# Patient Record
Sex: MALE | Race: WHITE | NOT HISPANIC OR LATINO | Employment: FULL TIME | URBAN - METROPOLITAN AREA
[De-identification: names, ages, dates, MRNs, and addresses within clinical notes are randomized per-mention and may not be internally consistent; named-entity substitution may affect disease eponyms.]

---

## 2023-02-21 LAB
ALANINE AMINOTRANSFERASE (SGPT) (U/L) IN SER/PLAS: 67 U/L (ref 10–52)
ALBUMIN (G/DL) IN SER/PLAS: 4.8 G/DL (ref 3.4–5)
ALKALINE PHOSPHATASE (U/L) IN SER/PLAS: 75 U/L (ref 33–120)
ANION GAP IN SER/PLAS: 12 MMOL/L (ref 10–20)
ASPARTATE AMINOTRANSFERASE (SGOT) (U/L) IN SER/PLAS: 56 U/L (ref 9–39)
BASOPHILS (10*3/UL) IN BLOOD BY AUTOMATED COUNT: 0.03 X10E9/L (ref 0–0.1)
BASOPHILS/100 LEUKOCYTES IN BLOOD BY AUTOMATED COUNT: 0.6 % (ref 0–2)
BILIRUBIN DIRECT (MG/DL) IN SER/PLAS: 0.2 MG/DL (ref 0–0.3)
BILIRUBIN TOTAL (MG/DL) IN SER/PLAS: 1.2 MG/DL (ref 0–1.2)
CALCIUM (MG/DL) IN SER/PLAS: 10.8 MG/DL (ref 8.6–10.6)
CARBON DIOXIDE, TOTAL (MMOL/L) IN SER/PLAS: 29 MMOL/L (ref 21–32)
CERULOPLASMIN (MG/DL) IN SER/PLAS: 24 MG/DL (ref 20–60)
CHLORIDE (MMOL/L) IN SER/PLAS: 102 MMOL/L (ref 98–107)
CHOLESTEROL (MG/DL) IN SER/PLAS: 263 MG/DL (ref 0–199)
CHOLESTEROL IN HDL (MG/DL) IN SER/PLAS: 46.8 MG/DL
CHOLESTEROL/HDL RATIO: 5.6
CREATININE (MG/DL) IN SER/PLAS: 0.98 MG/DL (ref 0.5–1.3)
EOSINOPHILS (10*3/UL) IN BLOOD BY AUTOMATED COUNT: 0.03 X10E9/L (ref 0–0.7)
EOSINOPHILS/100 LEUKOCYTES IN BLOOD BY AUTOMATED COUNT: 0.6 % (ref 0–6)
ERYTHROCYTE DISTRIBUTION WIDTH (RATIO) BY AUTOMATED COUNT: 11.7 % (ref 11.5–14.5)
ERYTHROCYTE MEAN CORPUSCULAR HEMOGLOBIN CONCENTRATION (G/DL) BY AUTOMATED: 34.2 G/DL (ref 32–36)
ERYTHROCYTE MEAN CORPUSCULAR VOLUME (FL) BY AUTOMATED COUNT: 94 FL (ref 80–100)
ERYTHROCYTES (10*6/UL) IN BLOOD BY AUTOMATED COUNT: 4.7 X10E12/L (ref 4.5–5.9)
FERRITIN (UG/LL) IN SER/PLAS: 1060 UG/L (ref 20–300)
GFR MALE: >90 ML/MIN/1.73M2
GLUCOSE (MG/DL) IN SER/PLAS: 102 MG/DL (ref 74–99)
HEMATOCRIT (%) IN BLOOD BY AUTOMATED COUNT: 44.1 % (ref 41–52)
HEMOGLOBIN (G/DL) IN BLOOD: 15.1 G/DL (ref 13.5–17.5)
HEPATITIS A TOTAL AB INTERPRETATION: NONREACTIVE
HEPATITIS B VIRUS SURFACE AB (MIU/ML) IN SERUM: <3.1 MIU/ML
HEPATITIS B VIRUS SURFACE AG PRESENCE IN SERUM: NONREACTIVE
HEPATITIS C VIRUS AB PRESENCE IN SERUM: NONREACTIVE
IMMATURE GRANULOCYTES/100 LEUKOCYTES IN BLOOD BY AUTOMATED COUNT: 0.4 % (ref 0–0.9)
INR IN PPP BY COAGULATION ASSAY: 1 (ref 0.9–1.1)
IRON (UG/DL) IN SER/PLAS: 168 UG/DL (ref 35–150)
IRON BINDING CAPACITY (UG/DL) IN SER/PLAS: 410 UG/DL (ref 240–445)
IRON SATURATION (%) IN SER/PLAS: 41 % (ref 25–45)
LDL: ABNORMAL MG/DL (ref 0–99)
LEUKOCYTES (10*3/UL) IN BLOOD BY AUTOMATED COUNT: 5.1 X10E9/L (ref 4.4–11.3)
LYMPHOCYTES (10*3/UL) IN BLOOD BY AUTOMATED COUNT: 2.28 X10E9/L (ref 1.2–4.8)
LYMPHOCYTES/100 LEUKOCYTES IN BLOOD BY AUTOMATED COUNT: 44.4 % (ref 13–44)
MONOCYTES (10*3/UL) IN BLOOD BY AUTOMATED COUNT: 0.31 X10E9/L (ref 0.1–1)
MONOCYTES/100 LEUKOCYTES IN BLOOD BY AUTOMATED COUNT: 6 % (ref 2–10)
NEUTROPHILS (10*3/UL) IN BLOOD BY AUTOMATED COUNT: 2.47 X10E9/L (ref 1.2–7.7)
NEUTROPHILS/100 LEUKOCYTES IN BLOOD BY AUTOMATED COUNT: 48 % (ref 40–80)
NRBC (PER 100 WBCS) BY AUTOMATED COUNT: 0 /100 WBC (ref 0–0)
PLATELETS (10*3/UL) IN BLOOD AUTOMATED COUNT: 170 X10E9/L (ref 150–450)
POTASSIUM (MMOL/L) IN SER/PLAS: 3.9 MMOL/L (ref 3.5–5.3)
PROTEIN TOTAL: 7.4 G/DL (ref 6.4–8.2)
PROTHROMBIN TIME (PT) IN PPP BY COAGULATION ASSAY: 11.5 SEC (ref 9.8–13.4)
SODIUM (MMOL/L) IN SER/PLAS: 139 MMOL/L (ref 136–145)
TRIGLYCERIDE (MG/DL) IN SER/PLAS: 615 MG/DL (ref 0–149)
UREA NITROGEN (MG/DL) IN SER/PLAS: 14 MG/DL (ref 6–23)
VLDL: ABNORMAL MG/DL (ref 0–40)

## 2023-02-23 LAB
ANTI-NUCLEAR ANTIBODY (ANA): NEGATIVE
ANTI-SMOOTH MUSCLE ANTIBODY: ABNORMAL
MITOCHONDRIAL ANTIBODY: NEGATIVE

## 2023-02-27 LAB
ALPHA-1 ANTITRYPSIN PHENOTYPE: NORMAL
ALPHA-1-ANTITRYPSIN (REF): 127 MG/DL (ref 90–200)

## 2023-04-13 RX ORDER — LAMOTRIGINE 25 MG/1
75 TABLET ORAL 2 TIMES DAILY
COMMUNITY
Start: 2022-11-14

## 2023-04-13 RX ORDER — AMLODIPINE BESYLATE 5 MG/1
1 TABLET ORAL DAILY
COMMUNITY
Start: 2022-07-21 | End: 2023-04-14

## 2023-04-13 RX ORDER — SERTRALINE HYDROCHLORIDE 100 MG/1
2 TABLET, FILM COATED ORAL DAILY
COMMUNITY
Start: 2021-02-10 | End: 2023-05-08 | Stop reason: ALTCHOICE

## 2023-04-14 ENCOUNTER — OFFICE VISIT (OUTPATIENT)
Dept: PRIMARY CARE | Facility: CLINIC | Age: 48
End: 2023-04-14
Payer: COMMERCIAL

## 2023-04-14 VITALS
WEIGHT: 295 LBS | TEMPERATURE: 97.6 F | BODY MASS INDEX: 35.92 KG/M2 | HEIGHT: 76 IN | DIASTOLIC BLOOD PRESSURE: 100 MMHG | SYSTOLIC BLOOD PRESSURE: 180 MMHG

## 2023-04-14 DIAGNOSIS — M62.838 MUSCLE SPASM: ICD-10-CM

## 2023-04-14 DIAGNOSIS — E78.1 HYPERTRIGLYCERIDEMIA: ICD-10-CM

## 2023-04-14 DIAGNOSIS — I10 PRIMARY HYPERTENSION: ICD-10-CM

## 2023-04-14 DIAGNOSIS — Z00.00 HEALTHCARE MAINTENANCE: Primary | ICD-10-CM

## 2023-04-14 PROCEDURE — 99215 OFFICE O/P EST HI 40 MIN: CPT | Performed by: INTERNAL MEDICINE

## 2023-04-14 PROCEDURE — 3080F DIAST BP >= 90 MM HG: CPT | Performed by: INTERNAL MEDICINE

## 2023-04-14 PROCEDURE — 1036F TOBACCO NON-USER: CPT | Performed by: INTERNAL MEDICINE

## 2023-04-14 PROCEDURE — 3077F SYST BP >= 140 MM HG: CPT | Performed by: INTERNAL MEDICINE

## 2023-04-14 RX ORDER — AMLODIPINE BESYLATE 10 MG/1
10 TABLET ORAL DAILY
Qty: 30 TABLET | Refills: 3 | Status: SHIPPED | OUTPATIENT
Start: 2023-04-14 | End: 2023-10-24

## 2023-04-14 RX ORDER — DESVENLAFAXINE SUCCINATE 25 MG/1
50 TABLET, EXTENDED RELEASE ORAL DAILY
COMMUNITY
Start: 2023-03-24 | End: 2024-03-20 | Stop reason: ALTCHOICE

## 2023-04-14 RX ORDER — CYCLOBENZAPRINE HCL 10 MG
10 TABLET ORAL 3 TIMES DAILY PRN
Qty: 30 TABLET | Refills: 2 | Status: SHIPPED | OUTPATIENT
Start: 2023-04-14 | End: 2023-11-17 | Stop reason: WASHOUT

## 2023-04-14 RX ORDER — EZETIMIBE 10 MG/1
10 TABLET ORAL DAILY
Qty: 30 TABLET | Refills: 5 | Status: SHIPPED | OUTPATIENT
Start: 2023-04-14 | End: 2024-01-31 | Stop reason: HOSPADM

## 2023-04-14 RX ORDER — LORATADINE 10 MG/1
10 TABLET ORAL
COMMUNITY

## 2023-04-14 ASSESSMENT — PATIENT HEALTH QUESTIONNAIRE - PHQ9
1. LITTLE INTEREST OR PLEASURE IN DOING THINGS: NOT AT ALL
2. FEELING DOWN, DEPRESSED OR HOPELESS: SEVERAL DAYS
SUM OF ALL RESPONSES TO PHQ9 QUESTIONS 1 AND 2: 1

## 2023-04-14 NOTE — PROGRESS NOTES
Subjective   Patient ID: [id number]   [facility]    Chano Smith is a 48 y.o. male who presents for Back Pain (Left back), Hip Pain (Left hip pain, No injury, been happening for a while. Easter is when it started again. 7-8 pain level but then it will flare up and be off the scale. ), and Foot Swelling (Left foot swollen.).He used to drink a lot before he stopped drinking for a while but last week he drank.He was following with Dr Hutchison who ordered all his lab work last February and March and reviewed with him.  He is triglyceride, total cholesterol and LDL high, his level [place] is high most likely due to drinking.  His ferritin is also high more than thousand.  He is having back pain and patient was tearful today this morning and blood pressure found to be elevated as per patient his body weight is going up.        Objective   Visit Vitals  BP (!) 180/100 (BP Location: Left arm)   Temp 36.4 °C (97.6 °F) (Temporal)      Review of Systems  All 12 systems reviewed, no other abnormality except that mentioned in HPI.    Physical Exam  Constitutional- no abnormality  ENT- no abnormality  Neck- no swelling  CVS- normal S1 and S2, no murmur.  Pulmonary- clear to auscultation,no rhonchi, no wheezes.  Abdomen- normal- liver and spleen, soft, no distension, bowel sound present.  Neurological- all cranial nerves intact, speech and gait normal, no sensory or motor deficiency.  Musculoskeletal- all pulses are normal, normal movement, no joint swelling.  Skin- no rash, dry.  psychiatry- no suicidal ideation.  Genitourinary system- no abnormality.  Lymph node- no lyphadenopathy    Assessment/Plan   Problem List Items Addressed This Visit          Circulatory    Primary hypertension    Relevant Medications    amLODIPine (Norvasc) 10 mg tablet    Other Relevant Orders    Follow Up In Primary Care       Musculoskeletal    Muscle spasm    Relevant Medications    cyclobenzaprine (Flexeril) 10 mg tablet    Other Relevant Orders    Referral to  Physical Therapy       Other    Hypertriglyceridemia    Relevant Medications    ezetimibe (Zetia) 10 mg tablet    Healthcare maintenance - Primary    Relevant Orders    TSH with reflex to Free T4 if abnormal    Hemoglobin A1C    Comprehensive Metabolic Panel   1.  Hypertriglyceridemia: He has liver enzyme elevation due to drinking, advised to quit drinking completely, started ezetimibe 10 mg p.o. daily.  And dietary control.  2.  Hypertension: His blood pressures high found to be 180/100, changed amlodipine 5 mg to 10 mg p.o. daily.  1 month follow-up  3.  Care maintenance: He is thyroid function test hemoglobin A1c and CMP ordered.  4.  Morbidly obese: Advised to cut down calorie, physical activities.      Patel Oreilly MD

## 2023-05-08 ENCOUNTER — OFFICE VISIT (OUTPATIENT)
Dept: PRIMARY CARE | Facility: CLINIC | Age: 48
End: 2023-05-08
Payer: COMMERCIAL

## 2023-05-08 VITALS
TEMPERATURE: 97.9 F | DIASTOLIC BLOOD PRESSURE: 87 MMHG | SYSTOLIC BLOOD PRESSURE: 130 MMHG | WEIGHT: 301 LBS | HEART RATE: 90 BPM | HEIGHT: 74 IN | BODY MASS INDEX: 38.63 KG/M2

## 2023-05-08 DIAGNOSIS — K76.0 FATTY LIVER: ICD-10-CM

## 2023-05-08 DIAGNOSIS — E78.1 HYPERTRIGLYCERIDEMIA: Primary | ICD-10-CM

## 2023-05-08 DIAGNOSIS — I10 PRIMARY HYPERTENSION: ICD-10-CM

## 2023-05-08 DIAGNOSIS — R73.01 IMPAIRED FASTING BLOOD SUGAR: ICD-10-CM

## 2023-05-08 PROBLEM — E83.42 HYPOMAGNESEMIA: Status: RESOLVED | Noted: 2023-05-08 | Resolved: 2023-05-08

## 2023-05-08 PROBLEM — E83.52 HYPERCALCEMIA: Status: RESOLVED | Noted: 2023-05-08 | Resolved: 2023-05-08

## 2023-05-08 PROBLEM — M72.2 PLANTAR FASCIITIS: Status: ACTIVE | Noted: 2023-05-08

## 2023-05-08 PROBLEM — G47.00 INSOMNIA: Status: ACTIVE | Noted: 2023-05-08

## 2023-05-08 PROBLEM — F41.9 ANXIETY: Status: ACTIVE | Noted: 2023-05-08

## 2023-05-08 PROBLEM — J30.89 ALLERGIC RHINITIS DUE TO DUST MITE: Status: RESOLVED | Noted: 2023-05-08 | Resolved: 2023-05-08

## 2023-05-08 PROBLEM — D69.6 THROMBOCYTOPENIA (CMS-HCC): Status: ACTIVE | Noted: 2023-05-08

## 2023-05-08 PROBLEM — J30.2 SEASONAL ALLERGIES: Status: ACTIVE | Noted: 2023-05-08

## 2023-05-08 PROBLEM — R74.8 ELEVATED LIVER ENZYMES: Status: ACTIVE | Noted: 2023-05-08

## 2023-05-08 PROBLEM — M76.72 PERONEAL TENDONITIS OF LEFT LOWER EXTREMITY: Status: RESOLVED | Noted: 2023-05-08 | Resolved: 2023-05-08

## 2023-05-08 PROBLEM — F10.20 ALCOHOL USE DISORDER, SEVERE, DEPENDENCE (MULTI): Status: RESOLVED | Noted: 2023-05-08 | Resolved: 2023-05-08

## 2023-05-08 PROBLEM — J30.1 ALLERGIC RHINITIS DUE TO POLLEN: Status: RESOLVED | Noted: 2023-05-08 | Resolved: 2023-05-08

## 2023-05-08 PROBLEM — M21.70 LEG LENGTH DIFFERENCE, ACQUIRED: Status: RESOLVED | Noted: 2023-05-08 | Resolved: 2023-05-08

## 2023-05-08 PROBLEM — R16.1 SPLEEN ENLARGED: Status: ACTIVE | Noted: 2023-05-08

## 2023-05-08 PROBLEM — F32.A DEPRESSION: Status: ACTIVE | Noted: 2023-05-08

## 2023-05-08 PROBLEM — D72.819 LEUKOPENIA: Status: RESOLVED | Noted: 2023-05-08 | Resolved: 2023-05-08

## 2023-05-08 PROCEDURE — 3008F BODY MASS INDEX DOCD: CPT | Performed by: NURSE PRACTITIONER

## 2023-05-08 PROCEDURE — 99214 OFFICE O/P EST MOD 30 MIN: CPT | Performed by: NURSE PRACTITIONER

## 2023-05-08 PROCEDURE — 3075F SYST BP GE 130 - 139MM HG: CPT | Performed by: NURSE PRACTITIONER

## 2023-05-08 PROCEDURE — 3079F DIAST BP 80-89 MM HG: CPT | Performed by: NURSE PRACTITIONER

## 2023-05-08 PROCEDURE — 1036F TOBACCO NON-USER: CPT | Performed by: NURSE PRACTITIONER

## 2023-05-08 RX ORDER — CLONAZEPAM 0.5 MG/1
0.5 TABLET ORAL NIGHTLY
COMMUNITY
Start: 2023-05-02

## 2023-05-08 ASSESSMENT — ENCOUNTER SYMPTOMS
CHILLS: 0
DIZZINESS: 0
NAUSEA: 0
HEADACHES: 0
PALPITATIONS: 0
NUMBNESS: 0
COUGH: 0
FATIGUE: 0
VOMITING: 0
DIARRHEA: 0
CHEST TIGHTNESS: 0
WEAKNESS: 0
FEVER: 0
ABDOMINAL PAIN: 0
SHORTNESS OF BREATH: 0

## 2023-05-08 NOTE — PROGRESS NOTES
"Subjective   Chano Smith is a 48 y.o. male who presents for Follow-up (6 month).    HPI   He is here today for medication refills and follow up.  Tolerating medication well at current dose- no side effects. Has noticed some mild edema in feet. No chest pain, shortness of breath, palpitations or edema. No numbness, tingling, weakness or vision changes.  (+) headaches -severe at times  Has noticed some changes in vision. Has eye exam next week  No dizziness.  Home blood pressure readings:126/78  Last eye exam: last week  Diet: Working on it  Exercise: Needs to get back into it regularly.  Weight: Trending up.  Has cut way back on alcohol.  Did a half dry January and dry lent.    He feels he is doing much better on the current medication regimen.  Less impulsive than prior.  Following with psychiatry- Lore Olguin- [facility].  Has had quite a few medication changes.  (Going every 3 months)  Following with hepatology Dr. Forrester- follow up in October.  Had Fibroscan  Saw Hematology- no further follow up recommended    Last labs:     Review of Systems   Constitutional:  Negative for chills, fatigue and fever.   Eyes:  Positive for visual disturbance.   Respiratory:  Negative for cough, chest tightness and shortness of breath.    Cardiovascular:  Positive for leg swelling. Negative for chest pain and palpitations.   Gastrointestinal:  Negative for abdominal pain, diarrhea, nausea and vomiting.   Neurological:  Negative for dizziness, weakness, numbness and headaches.       Objective   /87 (BP Location: Left arm, Patient Position: Sitting)   Pulse 90   Temp 36.6 °C (97.9 °F) (Temporal)   Ht 1.873 m (6' 1.75\")   Wt 137 kg (301 lb)   BMI 38.91 kg/m²     Physical Exam  Constitutional:       General: He is not in acute distress.     Appearance: Normal appearance. He is not toxic-appearing.   Eyes:      Extraocular Movements: Extraocular movements intact.      Conjunctiva/sclera: Conjunctivae normal.      " Pupils: Pupils are equal, round, and reactive to light.   Neck:      Vascular: No carotid bruit.   Cardiovascular:      Rate and Rhythm: Normal rate and regular rhythm.      Pulses: Normal pulses.      Heart sounds: Normal heart sounds, S1 normal and S2 normal. No murmur heard.  Pulmonary:      Effort: Pulmonary effort is normal. No respiratory distress.      Breath sounds: Normal breath sounds.   Abdominal:      General: Bowel sounds are normal.      Palpations: Abdomen is soft.      Tenderness: There is no abdominal tenderness.   Musculoskeletal:      Right lower leg: No edema.      Left lower leg: No edema.   Lymphadenopathy:      Cervical: No cervical adenopathy.   Neurological:      Mental Status: He is alert and oriented to person, place, and time.   Psychiatric:         Attention and Perception: Attention normal.         Mood and Affect: Mood and affect normal.         Behavior: Behavior normal. Behavior is cooperative.         Thought Content: Thought content normal.         Cognition and Memory: Cognition normal.         Judgment: Judgment normal.         Assessment/Plan   Problem List Items Addressed This Visit          Circulatory    Primary hypertension     Well controlled on current medication. Continue.            Digestive    Fatty liver     Following with Dr. Manning.            Endocrine/Metabolic    Impaired fasting blood sugar     Check hemoglobin A1C.            Other    Hypertriglyceridemia - Primary     On Zetia- recheck labs after on for 3 months- order placed.  May consider statin- would check with Usama if needed.         Relevant Orders    Lipid Panel     Other Visit Diagnoses       BMI 38.0-38.9,adult        Relevant Orders    Referral to Nutrition Services

## 2023-05-11 NOTE — ASSESSMENT & PLAN NOTE
On Zetia- recheck labs after on for 3 months- order placed.  May consider statin- would check with Usama if needed.

## 2023-06-26 ENCOUNTER — OFFICE VISIT (OUTPATIENT)
Dept: PRIMARY CARE | Facility: CLINIC | Age: 48
End: 2023-06-26
Payer: COMMERCIAL

## 2023-06-26 VITALS
BODY MASS INDEX: 35.68 KG/M2 | SYSTOLIC BLOOD PRESSURE: 154 MMHG | HEART RATE: 93 BPM | HEIGHT: 77 IN | TEMPERATURE: 96.9 F | WEIGHT: 302.2 LBS | DIASTOLIC BLOOD PRESSURE: 102 MMHG

## 2023-06-26 DIAGNOSIS — R07.81 RIB PAIN ON RIGHT SIDE: Primary | ICD-10-CM

## 2023-06-26 DIAGNOSIS — M54.2 NECK PAIN: ICD-10-CM

## 2023-06-26 PROCEDURE — 3008F BODY MASS INDEX DOCD: CPT | Performed by: NURSE PRACTITIONER

## 2023-06-26 PROCEDURE — 3080F DIAST BP >= 90 MM HG: CPT | Performed by: NURSE PRACTITIONER

## 2023-06-26 PROCEDURE — 3077F SYST BP >= 140 MM HG: CPT | Performed by: NURSE PRACTITIONER

## 2023-06-26 PROCEDURE — 1036F TOBACCO NON-USER: CPT | Performed by: NURSE PRACTITIONER

## 2023-06-26 PROCEDURE — 99213 OFFICE O/P EST LOW 20 MIN: CPT | Performed by: NURSE PRACTITIONER

## 2023-06-26 ASSESSMENT — ENCOUNTER SYMPTOMS
NECK PAIN: 0
CHILLS: 0
FEVER: 0
WEAKNESS: 0
FATIGUE: 0
NUMBNESS: 0
HEADACHES: 1
SHORTNESS OF BREATH: 0
COUGH: 0

## 2023-06-26 NOTE — PATIENT INSTRUCTIONS
Obtain the x-ray of the ribs as ordered today.  Referral to PT for neck pain.  Let me know if there is no improvement or symptoms worse.

## 2023-06-26 NOTE — PROGRESS NOTES
"Subjective   Patient ID: Chano Smith is a 48 y.o. male who presents for Rib Injury (Pain with cough sneezing, or deep breaths).    HPI  He reports he is having pain in the right rib which started 2 weeks ago while doing Judo. While doing moves he felt and heard a pop with associated pain. Since then it has hurt with deep breaths, coughing, sneezing and moving. It is worse first thing in the morning and gets better as the day goes one.  No SOB noted.  No bruising noted.    He also reports he has some head pain that goes into the neck and shoulder. No radiation down the arm.  Neck cracks with certain movements.  No numbness/tingling/weakness.  (+)headaches frequently.  (+) history of playing football and multiple falls.    Review of Systems   Constitutional:  Negative for chills, fatigue and fever.   Respiratory:  Negative for cough and shortness of breath.    Musculoskeletal:  Negative for neck pain.   Neurological:  Positive for headaches. Negative for weakness and numbness.       Objective   BP (!) 154/102   Pulse 93   Temp 36.1 °C (96.9 °F)   Ht 1.956 m (6' 5\")   Wt 137 kg (302 lb 3.2 oz)   BMI 35.84 kg/m²     Physical Exam  Constitutional:       General: He is not in acute distress.     Appearance: Normal appearance. He is not toxic-appearing.   Cardiovascular:      Rate and Rhythm: Normal rate and regular rhythm.      Heart sounds: Normal heart sounds, S1 normal and S2 normal. No murmur heard.  Pulmonary:      Effort: Pulmonary effort is normal.      Breath sounds: Normal breath sounds and air entry.   Abdominal:      General: Bowel sounds are normal.      Palpations: Abdomen is soft.      Tenderness: There is no abdominal tenderness.   Lymphadenopathy:      Cervical: No cervical adenopathy.   Neurological:      Mental Status: He is alert and oriented to person, place, and time.      Motor: Motor function is intact.   Psychiatric:         Attention and Perception: Attention normal.         Mood and " Affect: Mood and affect normal.         Behavior: Behavior normal.         Thought Content: Thought content normal.         Judgment: Judgment normal.         Assessment/Plan   Problem List Items Addressed This Visit          Nervous    Neck pain    Relevant Orders    Referral to Physical Therapy       Musculoskeletal    Rib pain on right side - Primary    Relevant Orders    XR ribs right 2 views            It has been a pleasure seeing you today!

## 2023-08-21 RX ORDER — AMLODIPINE BESYLATE 5 MG/1
5 TABLET ORAL DAILY
COMMUNITY
End: 2023-11-17 | Stop reason: WASHOUT

## 2023-08-22 PROBLEM — E87.5 HYPERKALEMIA: Status: ACTIVE | Noted: 2023-08-22

## 2023-10-02 ENCOUNTER — APPOINTMENT (OUTPATIENT)
Dept: HEMATOLOGY/ONCOLOGY | Facility: CLINIC | Age: 48
End: 2023-10-02
Payer: COMMERCIAL

## 2023-10-23 DIAGNOSIS — I10 PRIMARY HYPERTENSION: ICD-10-CM

## 2023-10-24 RX ORDER — AMLODIPINE BESYLATE 10 MG/1
10 TABLET ORAL DAILY
Qty: 30 TABLET | Refills: 1 | Status: SHIPPED | OUTPATIENT
Start: 2023-10-24 | End: 2024-01-16

## 2023-10-27 ENCOUNTER — OFFICE VISIT (OUTPATIENT)
Dept: OPHTHALMOLOGY | Facility: CLINIC | Age: 48
End: 2023-10-27

## 2023-10-27 DIAGNOSIS — H52.4 PRESBYOPIA: ICD-10-CM

## 2023-10-27 DIAGNOSIS — H52.223 REGULAR ASTIGMATISM OF BOTH EYES: ICD-10-CM

## 2023-10-27 DIAGNOSIS — H52.03 HYPEROPIA OF BOTH EYES: Primary | ICD-10-CM

## 2023-10-27 PROCEDURE — 92310 CONTACT LENS FITTING OU: CPT | Performed by: OPTOMETRIST

## 2023-10-27 PROCEDURE — V2520 CONTACT LENS HYDROPHILIC: HCPCS | Performed by: OPTOMETRIST

## 2023-10-27 PROCEDURE — V2521 CNTCT LENS HYDROPHILIC TORIC: HCPCS | Performed by: OPTOMETRIST

## 2023-10-27 ASSESSMENT — ENCOUNTER SYMPTOMS
GASTROINTESTINAL NEGATIVE: 0
ENDOCRINE NEGATIVE: 0
CONSTITUTIONAL NEGATIVE: 0
CARDIOVASCULAR NEGATIVE: 1
ALLERGIC/IMMUNOLOGIC NEGATIVE: 0
MUSCULOSKELETAL NEGATIVE: 0
RESPIRATORY NEGATIVE: 0
PSYCHIATRIC NEGATIVE: 1
NEUROLOGICAL NEGATIVE: 0
EYES NEGATIVE: 0
HEMATOLOGIC/LYMPHATIC NEGATIVE: 0

## 2023-10-27 ASSESSMENT — REFRACTION_MANIFEST
OS_SPHERE: +1.00
OS_SPHERE: +1.50
OS_AXIS: 045
OD_ADD: +2.00
OD_AXIS: 096
OD_CYLINDER: -1.50
OS_CYLINDER: -0.50
OD_CYLINDER: -1.25
OS_CYLINDER: -0.50
OS_AXIS: 060
METHOD_AUTOREFRACTION: 1
OD_SPHERE: +3.00
OS_ADD: +2.00
OD_AXIS: 096
OD_SPHERE: +3.50

## 2023-10-27 ASSESSMENT — CONF VISUAL FIELD
OD_INFERIOR_TEMPORAL_RESTRICTION: 0
OS_NORMAL: 1
METHOD: COUNTING FINGERS
OS_INFERIOR_TEMPORAL_RESTRICTION: 0
OD_SUPERIOR_TEMPORAL_RESTRICTION: 0
OS_SUPERIOR_NASAL_RESTRICTION: 0
OS_INFERIOR_NASAL_RESTRICTION: 0
OD_INFERIOR_NASAL_RESTRICTION: 0
OS_SUPERIOR_TEMPORAL_RESTRICTION: 0
OD_NORMAL: 1
OD_SUPERIOR_NASAL_RESTRICTION: 0

## 2023-10-27 ASSESSMENT — VISUAL ACUITY
OD_CC+: -1
CORRECTION_TYPE: GLASSES
OD_CC: 20/20
OD_CC: 20/20
METHOD: SNELLEN - LINEAR
OS_CC: 20/20
VA_OR_OD_CURRENT_RX: 20/20
OS_CC: 20/20
OS_CC+: -1

## 2023-10-27 ASSESSMENT — REFRACTION_WEARINGRX
OD_SPHERE: +2.25
OS_CYLINDER: -1.00
OD_AXIS: 110
OS_SPHERE: +1.00
OD_ADD: +1.75
OS_AXIS: 045
OS_ADD: +1.75
OD_CYLINDER: -1.00
SPECS_TYPE: PAL

## 2023-10-27 ASSESSMENT — REFRACTION_CURRENTRX
OD_AXIS: 090
OS_BRAND: BIOTRUE 1 DAY
OS_BASECURVE: 8.6
OD_BRAND: BIOTRUE 1 DAY FOR ASTIGMATISM
OS_DIAMETER: 14.2
OD_BASECURVE: 8.4
OD_DIAMETER: 14.2
OD_SPHERE: +3.00
OS_SPHERE: +2.50
OD_CYLINDER: -1.25

## 2023-10-27 ASSESSMENT — EXTERNAL EXAM - RIGHT EYE: OD_EXAM: NORMAL

## 2023-10-27 ASSESSMENT — SLIT LAMP EXAM - LIDS: COMMENTS: NORMAL

## 2023-10-27 ASSESSMENT — EXTERNAL EXAM - LEFT EYE: OS_EXAM: NORMAL

## 2023-10-27 NOTE — ASSESSMENT & PLAN NOTE
New patient. Established problem. Released updated contact lens (CL) Rx for monovision right eye (OD) dominant eye and trial lenses. Discussed wear and care of daily disposable lenses including not sleeping in lenses, replacing daily, and not exposing lenses to water. Patient expressed understanding. Remove lenses and RTC immediately if experiencing red, painful, photophobic eyes. Monitor yearly at [facility], sooner with problems.   Full eye exam not indicated today, as patient just had CEE in May.

## 2023-10-27 NOTE — PROGRESS NOTES
Assessment/Plan   Problem List Items Addressed This Visit             ICD-10-CM       Eye/Vision problems    Hyperopia of both eyes - Primary H52.03     New patient. Established problem. Released updated contact lens (CL) Rx for monovision right eye (OD) dominant eye and trial lenses. Discussed wear and care of daily disposable lenses including not sleeping in lenses, replacing daily, and not exposing lenses to water. Patient expressed understanding. Remove lenses and RTC immediately if experiencing red, painful, photophobic eyes. Monitor yearly at E, sooner with problems.   Current specs slightly under-plused OD. Consider binocular blur balance if returns in future for refraction/CL fit.   Discussed proper wear, care, replacement of contact lenses. Gave handout. D/c cl wear and RTC if eyes become red, painful, irritated. Monitor 1 year.   CL eval billed today. $45 (no exam billed today, done in May 2023).         Regular astigmatism of both eyes H52.223    Presbyopia H52.4

## 2023-11-08 ENCOUNTER — TELEPHONE (OUTPATIENT)
Dept: OPHTHALMOLOGY | Facility: CLINIC | Age: 48
End: 2023-11-08
Payer: COMMERCIAL

## 2023-11-08 NOTE — TELEPHONE ENCOUNTER
Ordered a 6 month supply of contact lens (CL) .  I will email contact lens (CL) RX to Chano as well.

## 2023-11-17 ENCOUNTER — LAB (OUTPATIENT)
Dept: LAB | Facility: LAB | Age: 48
End: 2023-11-17
Payer: COMMERCIAL

## 2023-11-17 ENCOUNTER — OFFICE VISIT (OUTPATIENT)
Dept: PRIMARY CARE | Facility: CLINIC | Age: 48
End: 2023-11-17
Payer: COMMERCIAL

## 2023-11-17 VITALS
DIASTOLIC BLOOD PRESSURE: 88 MMHG | BODY MASS INDEX: 34.51 KG/M2 | TEMPERATURE: 97.7 F | SYSTOLIC BLOOD PRESSURE: 120 MMHG | WEIGHT: 291 LBS

## 2023-11-17 DIAGNOSIS — R10.10 PAIN OF UPPER ABDOMEN: ICD-10-CM

## 2023-11-17 DIAGNOSIS — K57.90 DIVERTICULOSIS: ICD-10-CM

## 2023-11-17 DIAGNOSIS — K80.20 GALLSTONES: ICD-10-CM

## 2023-11-17 DIAGNOSIS — R19.8 BOWEL MOVEMENT SYMPTOM: ICD-10-CM

## 2023-11-17 DIAGNOSIS — R10.10 PAIN OF UPPER ABDOMEN: Primary | ICD-10-CM

## 2023-11-17 LAB
ALBUMIN SERPL BCP-MCNC: 3.6 G/DL (ref 3.4–5)
ALP SERPL-CCNC: 105 U/L (ref 33–120)
ALT SERPL W P-5'-P-CCNC: 82 U/L (ref 10–52)
AMYLASE SERPL-CCNC: 82 U/L (ref 29–103)
AST SERPL W P-5'-P-CCNC: 90 U/L (ref 9–39)
BASOPHILS # BLD AUTO: 0.02 X10*3/UL (ref 0–0.1)
BASOPHILS NFR BLD AUTO: 0.4 %
BILIRUB DIRECT SERPL-MCNC: 0.3 MG/DL (ref 0–0.3)
BILIRUB SERPL-MCNC: 1.1 MG/DL (ref 0–1.2)
EOSINOPHIL # BLD AUTO: 0.14 X10*3/UL (ref 0–0.7)
EOSINOPHIL NFR BLD AUTO: 2.5 %
ERYTHROCYTE [DISTWIDTH] IN BLOOD BY AUTOMATED COUNT: 12.5 % (ref 11.5–14.5)
HCT VFR BLD AUTO: 40.7 % (ref 41–52)
HGB BLD-MCNC: 13.7 G/DL (ref 13.5–17.5)
IMM GRANULOCYTES # BLD AUTO: 0.05 X10*3/UL (ref 0–0.7)
IMM GRANULOCYTES NFR BLD AUTO: 0.9 % (ref 0–0.9)
LIPASE SERPL-CCNC: 257 U/L (ref 9–82)
LYMPHOCYTES # BLD AUTO: 1.72 X10*3/UL (ref 1.2–4.8)
LYMPHOCYTES NFR BLD AUTO: 31.1 %
MCH RBC QN AUTO: 33.6 PG (ref 26–34)
MCHC RBC AUTO-ENTMCNC: 33.7 G/DL (ref 32–36)
MCV RBC AUTO: 100 FL (ref 80–100)
MONOCYTES # BLD AUTO: 0.43 X10*3/UL (ref 0.1–1)
MONOCYTES NFR BLD AUTO: 7.8 %
NEUTROPHILS # BLD AUTO: 3.17 X10*3/UL (ref 1.2–7.7)
NEUTROPHILS NFR BLD AUTO: 57.3 %
NRBC BLD-RTO: 0 /100 WBCS (ref 0–0)
PLATELET # BLD AUTO: 111 X10*3/UL (ref 150–450)
PROT SERPL-MCNC: 6.2 G/DL (ref 6.4–8.2)
RBC # BLD AUTO: 4.08 X10*6/UL (ref 4.5–5.9)
WBC # BLD AUTO: 5.5 X10*3/UL (ref 4.4–11.3)

## 2023-11-17 PROCEDURE — 99214 OFFICE O/P EST MOD 30 MIN: CPT | Performed by: NURSE PRACTITIONER

## 2023-11-17 PROCEDURE — 85025 COMPLETE CBC W/AUTO DIFF WBC: CPT

## 2023-11-17 PROCEDURE — 3079F DIAST BP 80-89 MM HG: CPT | Performed by: NURSE PRACTITIONER

## 2023-11-17 PROCEDURE — 80076 HEPATIC FUNCTION PANEL: CPT

## 2023-11-17 PROCEDURE — 83690 ASSAY OF LIPASE: CPT

## 2023-11-17 PROCEDURE — 1036F TOBACCO NON-USER: CPT | Performed by: NURSE PRACTITIONER

## 2023-11-17 PROCEDURE — 36415 COLL VENOUS BLD VENIPUNCTURE: CPT

## 2023-11-17 PROCEDURE — 82150 ASSAY OF AMYLASE: CPT

## 2023-11-17 PROCEDURE — 3074F SYST BP LT 130 MM HG: CPT | Performed by: NURSE PRACTITIONER

## 2023-11-17 PROCEDURE — 3008F BODY MASS INDEX DOCD: CPT | Performed by: NURSE PRACTITIONER

## 2023-11-17 ASSESSMENT — PATIENT HEALTH QUESTIONNAIRE - PHQ9
2. FEELING DOWN, DEPRESSED OR HOPELESS: NOT AT ALL
SUM OF ALL RESPONSES TO PHQ9 QUESTIONS 1 AND 2: 0
1. LITTLE INTEREST OR PLEASURE IN DOING THINGS: NOT AT ALL

## 2023-11-17 NOTE — PROGRESS NOTES
Subjective   Patient ID: Chano Smith is a 48 y.o. male who presents for Abdominal Pain (Heartburn, off and on).    HPI   Has episodes of pain for 3-4 days  at a time  Throws up and has diarrhea.  6/21 at the ER   Throws up and has diarrhea  Then a few days later feels better.      Monday to Urgent Care  Pain in middle of abdomen  Food affects him a lot  Cut out onions and gluten  There is a lot of things he avoids.  'Sarah Stomach'  - strong family hx of stomach problems.    More burping.  TUMS helps sone.  Typical BM - no such thing  Normal for a couple days  Healthy diet.  Did a food diary but did not identify the culprets.    Review of Systems   Gastrointestinal:  Positive for abdominal pain, anal bleeding, nausea and vomiting.   Psychiatric/Behavioral:  The patient is nervous/anxious.    All other systems reviewed and are negative.      Objective   /88   Temp 36.5 °C (97.7 °F)   Wt 132 kg (291 lb)   BMI 34.51 kg/m²   Colonoscopy  2/7/23  + Diverticuli  CT ER  7/19/22  Gallbladder sludge and gallstones & fatty liver  CT ER  7/5/23  Nl GB    Physical Exam  Vitals and nursing note reviewed.   Constitutional:       Appearance: He is obese.   HENT:      Mouth/Throat:      Pharynx: Oropharynx is clear.   Neck:      Thyroid: No thyroid mass, thyromegaly or thyroid tenderness.   Cardiovascular:      Rate and Rhythm: Normal rate and regular rhythm.      Pulses: Normal pulses.      Heart sounds: Normal heart sounds.   Pulmonary:      Effort: Pulmonary effort is normal.      Breath sounds: Normal breath sounds.   Abdominal:      General: Bowel sounds are normal.      Palpations: Abdomen is soft.      Tenderness: There is abdominal tenderness. There is guarding.   Skin:     General: Skin is warm.   Neurological:      Mental Status: He is alert and oriented to person, place, and time.   Psychiatric:         Mood and Affect: Mood normal.         Behavior: Behavior normal.         Assessment/Plan   Problem List  Items Addressed This Visit             ICD-10-CM    Bowel movement symptom R19.8    Relevant Orders    Referral to Gastroenterology    Diverticulosis K57.90    Relevant Orders    Referral to Gastroenterology    US abdomen limited liver (Completed)    Gallstones K80.20    Relevant Orders    US abdomen limited liver (Completed)    Pain of upper abdomen - Primary R10.10    Relevant Orders    CBC and Auto Differential (Completed)    Hepatic function panel (Completed)    Amylase (Completed)    Lipase (Completed)    Referral to Gastroenterology    US abdomen limited liver (Completed)

## 2023-11-17 NOTE — PATIENT INSTRUCTIONS
Labs today  U/S of abdomen - see  or call   [phone] to schedule  DAILY  use some Omeprazole (Prilosec)   Take one per day on an empty stomach - one hour before or 2 hours after eating.  REFER to GI   [phone]  Add fiber slurry (Metamucil or Benefiber) to your daily routine.  Stay well hydrated.  Follow up based on labs.

## 2023-11-19 DIAGNOSIS — E78.1 HYPERTRIGLYCERIDEMIA: ICD-10-CM

## 2023-11-20 ENCOUNTER — ANCILLARY PROCEDURE (OUTPATIENT)
Dept: RADIOLOGY | Facility: CLINIC | Age: 48
End: 2023-11-20
Payer: COMMERCIAL

## 2023-11-20 DIAGNOSIS — K57.90 DIVERTICULOSIS: ICD-10-CM

## 2023-11-20 DIAGNOSIS — K80.20 GALLSTONES: ICD-10-CM

## 2023-11-20 DIAGNOSIS — R10.10 PAIN OF UPPER ABDOMEN: ICD-10-CM

## 2023-11-20 PROCEDURE — 76705 ECHO EXAM OF ABDOMEN: CPT

## 2023-11-20 RX ORDER — EZETIMIBE 10 MG/1
10 TABLET ORAL DAILY
Qty: 30 TABLET | Refills: 0 | OUTPATIENT
Start: 2023-11-20

## 2023-11-22 DIAGNOSIS — R94.5 ABNORMAL LIVER FUNCTION: Primary | ICD-10-CM

## 2023-11-22 DIAGNOSIS — R74.8 ELEVATED LIPASE: ICD-10-CM

## 2023-11-30 ENCOUNTER — LAB (OUTPATIENT)
Dept: LAB | Facility: LAB | Age: 48
End: 2023-11-30
Payer: COMMERCIAL

## 2023-11-30 DIAGNOSIS — R94.5 ABNORMAL LIVER FUNCTION: ICD-10-CM

## 2023-11-30 DIAGNOSIS — R74.8 ELEVATED LIPASE: ICD-10-CM

## 2023-11-30 LAB
ALBUMIN SERPL BCP-MCNC: 4 G/DL (ref 3.4–5)
ALP SERPL-CCNC: 92 U/L (ref 33–120)
ALT SERPL W P-5'-P-CCNC: 72 U/L (ref 10–52)
AST SERPL W P-5'-P-CCNC: 108 U/L (ref 9–39)
BILIRUB DIRECT SERPL-MCNC: 0.2 MG/DL (ref 0–0.3)
BILIRUB SERPL-MCNC: 0.9 MG/DL (ref 0–1.2)
GGT SERPL-CCNC: 681 U/L (ref 5–64)
LIPASE SERPL-CCNC: 80 U/L (ref 9–82)
PROT SERPL-MCNC: 7.1 G/DL (ref 6.4–8.2)

## 2023-11-30 PROCEDURE — 83690 ASSAY OF LIPASE: CPT

## 2023-11-30 PROCEDURE — 36415 COLL VENOUS BLD VENIPUNCTURE: CPT

## 2023-11-30 PROCEDURE — 80076 HEPATIC FUNCTION PANEL: CPT

## 2023-11-30 PROCEDURE — 82977 ASSAY OF GGT: CPT

## 2023-12-04 DIAGNOSIS — E78.1 HYPERTRIGLYCERIDEMIA: ICD-10-CM

## 2023-12-04 RX ORDER — EZETIMIBE 10 MG/1
10 TABLET ORAL DAILY
Qty: 30 TABLET | Refills: 5 | Status: CANCELLED | OUTPATIENT
Start: 2023-12-04 | End: 2024-06-01

## 2023-12-10 DIAGNOSIS — R74.8 ABNORMAL LIVER ENZYMES: ICD-10-CM

## 2023-12-10 DIAGNOSIS — K80.20 GALLSTONES: Primary | ICD-10-CM

## 2023-12-10 DIAGNOSIS — R10.11 RIGHT UPPER QUADRANT ABDOMINAL PAIN: ICD-10-CM

## 2023-12-11 PROBLEM — K80.20 GALLSTONES: Status: ACTIVE | Noted: 2023-12-11

## 2023-12-11 PROBLEM — K57.90 DIVERTICULOSIS: Status: ACTIVE | Noted: 2023-12-11

## 2023-12-11 PROBLEM — R19.8 BOWEL MOVEMENT SYMPTOM: Status: ACTIVE | Noted: 2023-12-11

## 2023-12-11 ASSESSMENT — ENCOUNTER SYMPTOMS
VOMITING: 1
ABDOMINAL PAIN: 1
NAUSEA: 1
NERVOUS/ANXIOUS: 1
ANAL BLEEDING: 1

## 2023-12-11 NOTE — PROGRESS NOTES
Late Entry:  REFER to General Surgeon  Spoke with pt and reviewed plan - he will see PSYCH on Friday  Also talked about other PSYCH options

## 2023-12-11 NOTE — TELEPHONE ENCOUNTER
Chano,   I spoke with Aleah and she agrees that until tings are back on track with your liver enzymes, we should hold the Zetia.  (You should resume once the liver functions have decreased. )  For the General Surgeon you can call to schedule through Central [phone]  Dr. Pete Jonas or Dr. Tyrone Irby at [facility].  Let me know if you have issues scheduling.    Also be sure to do the appointment with GI and Dr. Forrester.    Take care!  LORI Lira

## 2023-12-26 ENCOUNTER — OFFICE VISIT (OUTPATIENT)
Dept: SURGERY | Facility: CLINIC | Age: 48
End: 2023-12-26
Payer: COMMERCIAL

## 2023-12-26 VITALS — BODY MASS INDEX: 36.31 KG/M2 | HEIGHT: 76 IN | WEIGHT: 298.2 LBS

## 2023-12-26 DIAGNOSIS — R74.8 ABNORMAL LIVER ENZYMES: ICD-10-CM

## 2023-12-26 DIAGNOSIS — K80.20 GALLSTONES: Primary | ICD-10-CM

## 2023-12-26 DIAGNOSIS — R10.11 RIGHT UPPER QUADRANT ABDOMINAL PAIN: ICD-10-CM

## 2023-12-26 PROCEDURE — 99204 OFFICE O/P NEW MOD 45 MIN: CPT | Performed by: SURGERY

## 2023-12-26 PROCEDURE — 3008F BODY MASS INDEX DOCD: CPT | Performed by: SURGERY

## 2023-12-26 PROCEDURE — 1036F TOBACCO NON-USER: CPT | Performed by: SURGERY

## 2023-12-26 RX ORDER — SODIUM CHLORIDE, SODIUM LACTATE, POTASSIUM CHLORIDE, CALCIUM CHLORIDE 600; 310; 30; 20 MG/100ML; MG/100ML; MG/100ML; MG/100ML
100 INJECTION, SOLUTION INTRAVENOUS CONTINUOUS
Status: CANCELLED | OUTPATIENT
Start: 2023-12-26

## 2023-12-26 ASSESSMENT — PAIN SCALES - GENERAL: PAINLEVEL: 0-NO PAIN

## 2023-12-26 NOTE — PROGRESS NOTES
.History Of Present Illness  Chano Smith is a 48 y.o. male presenting with 1 year history of postprandial bloating nausea diarrhea right upper quadrant abdominal pains.  Earlier this year he had some imaging showing gallstones and sludge.  He also has a history of transaminitis since been fairly consistent.  He has been able to mitigate his symptoms somewhat by altering his dietary intake greasy foods, cheeses and spicy foods.    History of hypertension mood disorder.  Lives with wife and 2 children.  He works for the [employer].  Non-smoker.  Formally overindulged in alcohol however he does not drink much at all anymore     Past Medical History  Past Medical History:   Diagnosis Date    Allergic rhinitis due to dust mite 05/08/2023    Allergic rhinitis due to pollen 05/08/2023    Hypomagnesemia 05/08/2023    Peroneal tendonitis of left lower extremity 05/08/2023       Surgical History  Past Surgical History:   Procedure Laterality Date    OTHER SURGICAL HISTORY  02/10/2021    Tonsillectomy    OTHER SURGICAL HISTORY  02/10/2021    Vasectomy        Social History  He reports that he has never smoked. He has never been exposed to tobacco smoke. He quit smokeless tobacco use about 17 years ago.  His smokeless tobacco use included chew. No history on file for alcohol use and drug use.    Family History  Family History   Problem Relation Name Age of Onset    No Known Problems Mother      Lung cancer Father      Diverticulitis Father      ALS Sister      Irritable bowel syndrome Sister      REYNOLD disease Brother      Diverticulosis Father's Sister      Macular degeneration Paternal Grandmother          Allergies  Acrylic acid and Cortisone    Review of Systems  Constitutional: no weight loss, no fevers, no malaise  HEENT: negative  Neck: negative  Pulmonary: no SOB, no cough  CV: no chest pain, otherwise negative  GI: See HPI  : no hematuria, retention.  MS: no aches/pains  Neurologic: negative  Skin:  "no rashes, lesions  HEME: no bleeding tendency, no bruising  Psych: no mood issues    Physical Exam     Last Recorded Vitals  Height 1.93 m (6' 4\"), weight 135 kg (298 lb 3.2 oz).    Relevant Results         Show images for US abdomen limited liver  Signed by    Signed Time Phone Pager   Kedar Martinez MD 11/21/2023 18:43 [phone]      Exam Information    Status Exam Begun Exam Ended   Final 11/20/2023 08:35 11/20/2023 09:11     Study Result    Narrative & Impression   Interpreted By:  Kedar Martinez,   STUDY:  US ABDOMEN LIMITED LIVER;  11/20/2023 9:11 am      INDICATION:  Signs/Symptoms:Hx gallstones and sludge (ER CT 7/19/22)   abdominal  pain epigastric, Nausea, vomiting, bowel changes.      COMPARISON:  None.      ACCESSION NUMBER(S):  [id number]      ORDERING CLINICIAN:  TRICIA CURIEL      TECHNIQUE:  Multiple sonographic images of the right upper quadrant were  obtained. Static images were obtained for remote interpretation.      FINDINGS:  LIVER:  The liver is enlarged, 21.5 cm in length. The hepatic parenchyma is  diffusely echogenic consistent with steatosis. The resultant beam  attenuation limits the assessment for focal lesions, but within this  limitation, no focal lesion is evident. The surface contour is not  frankly nodular.      GALLBLADDER:  The gallbladder is non-distended and contains gallstones and sludge.  No wall thickening or pericholecystic edema.      BILE DUCTS:  No evidence of intra or extrahepatic biliary dilatation is  identified; the common bile duct measures 6 mm.      PANCREAS:  The pancreas is poorly visualized due to overlying bowel gas.      KIDNEYS:  Suboptimally visualized due to in attenuation from the fatty liver.  Right kidney is normal size, 12.9 cm in length. Renal cortical  thickness and echogenicity are grossly normal. No hydronephrosis or  nephrolithiasis evident.      PERITONEUM:  There is no free or loculated fluid seen in the abdomen.      IMPRESSION:  1. The " gallbladder contains stones and sludge. No gallbladder wall  thickening or pericholecystic edema to indicate acute cholecystitis.  2. Hepatomegaly and steatosis.      MACRO:  None      Signed by: Kedar Martinez 11/21/2023 6:43 PM  Dictation workstation:   [id number]    Contains abnormal data Gamma GT  Order: [id number]  Status: Final result       Visible to patient: Yes (seen)       Dx: Abnormal liver function    0 Result Notes       1 Patient Communication      Component  Ref Range & Units 3 wk ago   GGT  5 - 64 U/L 681 High            Assessment/Plan       Impression:  Symptomatic Cholelithiasis    -I carefully explained the pathophysiology of biliary tract disease using terms and pictures  -Rationale for surgical intervention described  -Technique of laparoscopic cholecystectomy explained (both standard and robotic assisted)  -Risks of surgery elucidated (bleeding, infection, bile leak, CBD injury, etc)  -Other options presented (watchful waiting, avoidance of fatty foods)  -All patient questions answered to his satisfaction.  -Patient has indicated desire to pursue surgical intervention  -Office will arrange at patient convenience.       I spent 40 minutes in the professional and overall care of this patient.      Simon Jonas MD

## 2023-12-26 NOTE — LETTER
December 26, 2023     ALLYSON Saini-CNP  [address]  [facility], [address]  [facility] [address]    Patient: Chano Smith   YOB: 1975   Date of Visit: 12/26/2023       Dear ALLYSON Savage-CNP:    Thank you for referring Chano Smith to me for evaluation. Below are my notes for this consultation.  If you have questions, please do not hesitate to call me. I look forward to following your patient along with you.       Sincerely,     Simon Jonas MD      CC: No Recipients  ______________________________________________________________________________________    .History Of Present Illness  Chano Smith is a 48 y.o. male presenting with 1 year history of postprandial bloating nausea diarrhea right upper quadrant abdominal pains.  Earlier this year he had some imaging showing gallstones and sludge.  He also has a history of transaminitis since been fairly consistent.  He has been able to mitigate his symptoms somewhat by altering his dietary intake greasy foods, cheeses and spicy foods.    History of hypertension mood disorder.  Lives with wife and 2 children.  He works for the [employer].  Non-smoker.  Formally overindulged in alcohol however he does not drink much at all anymore     Past Medical History  Past Medical History:   Diagnosis Date   • Allergic rhinitis due to dust mite 05/08/2023   • Allergic rhinitis due to pollen 05/08/2023   • Hypomagnesemia 05/08/2023   • Peroneal tendonitis of left lower extremity 05/08/2023       Surgical History  Past Surgical History:   Procedure Laterality Date   • OTHER SURGICAL HISTORY  02/10/2021    Tonsillectomy   • OTHER SURGICAL HISTORY  02/10/2021    Vasectomy        Social History  He reports that he has never smoked. He has never been exposed to tobacco smoke. He quit smokeless tobacco use about 17 years ago.  His smokeless tobacco use included chew. No history on file for alcohol use and drug use.    Family  "History  Family History   Problem Relation Name Age of Onset   • No Known Problems Mother     • Lung cancer Father     • Diverticulitis Father     • ALS Sister     • Irritable bowel syndrome Sister     • REYNOLD disease Brother     • Diverticulosis Father's Sister     • Macular degeneration Paternal Grandmother          Allergies  Acrylic acid and Cortisone    Review of Systems  Constitutional: no weight loss, no fevers, no malaise  HEENT: negative  Neck: negative  Pulmonary: no SOB, no cough  CV: no chest pain, otherwise negative  GI: See HPI  : no hematuria, retention.  MS: no aches/pains  Neurologic: negative  Skin: no rashes, lesions  HEME: no bleeding tendency, no bruising  Psych: no mood issues    Physical Exam     Last Recorded Vitals  Height 1.93 m (6' 4\"), weight 135 kg (298 lb 3.2 oz).    Relevant Results         Show images for US abdomen limited liver  Signed by    Signed Time Phone Pager   Kedar Martinez MD 11/21/2023 18:43 [phone]      Exam Information    Status Exam Begun Exam Ended   Final 11/20/2023 08:35 11/20/2023 09:11     Study Result    Narrative & Impression   Interpreted By:  Kedar Martinez,   STUDY:  US ABDOMEN LIMITED LIVER;  11/20/2023 9:11 am      INDICATION:  Signs/Symptoms:Hx gallstones and sludge (ER CT 7/19/22)   abdominal  pain epigastric, Nausea, vomiting, bowel changes.      COMPARISON:  None.      ACCESSION NUMBER(S):  [id number]      ORDERING CLINICIAN:  TRICIA CURIEL      TECHNIQUE:  Multiple sonographic images of the right upper quadrant were  obtained. Static images were obtained for remote interpretation.      FINDINGS:  LIVER:  The liver is enlarged, 21.5 cm in length. The hepatic parenchyma is  diffusely echogenic consistent with steatosis. The resultant beam  attenuation limits the assessment for focal lesions, but within this  limitation, no focal lesion is evident. The surface contour is not  frankly nodular.      GALLBLADDER:  The gallbladder is non-distended " and contains gallstones and sludge.  No wall thickening or pericholecystic edema.      BILE DUCTS:  No evidence of intra or extrahepatic biliary dilatation is  identified; the common bile duct measures 6 mm.      PANCREAS:  The pancreas is poorly visualized due to overlying bowel gas.      KIDNEYS:  Suboptimally visualized due to in attenuation from the fatty liver.  Right kidney is normal size, 12.9 cm in length. Renal cortical  thickness and echogenicity are grossly normal. No hydronephrosis or  nephrolithiasis evident.      PERITONEUM:  There is no free or loculated fluid seen in the abdomen.      IMPRESSION:  1. The gallbladder contains stones and sludge. No gallbladder wall  thickening or pericholecystic edema to indicate acute cholecystitis.  2. Hepatomegaly and steatosis.      MACRO:  None      Signed by: Kedar Martinez 11/21/2023 6:43 PM  Dictation workstation:   [id number]    Contains abnormal data Gamma GT  Order: [id number]  Status: Final result       Visible to patient: Yes (seen)       Dx: Abnormal liver function    0 Result Notes       1 Patient Communication      Component  Ref Range & Units 3 wk ago   GGT  5 - 64 U/L 681 High            Assessment/Plan      Impression:  Symptomatic Cholelithiasis    -I carefully explained the pathophysiology of biliary tract disease using terms and pictures  -Rationale for surgical intervention described  -Technique of laparoscopic cholecystectomy explained (both standard and robotic assisted)  -Risks of surgery elucidated (bleeding, infection, bile leak, CBD injury, etc)  -Other options presented (watchful waiting, avoidance of fatty foods)  -All patient questions answered to his satisfaction.  -Patient has indicated desire to pursue surgical intervention  -Office will arrange at patient convenience.       I spent 40 minutes in the professional and overall care of this patient.      Simon Jonas MD

## 2024-01-14 DIAGNOSIS — I10 PRIMARY HYPERTENSION: ICD-10-CM

## 2024-01-16 RX ORDER — AMLODIPINE BESYLATE 10 MG/1
10 TABLET ORAL DAILY
Qty: 30 TABLET | Refills: 0 | Status: SHIPPED | OUTPATIENT
Start: 2024-01-16 | End: 2024-03-08 | Stop reason: SDUPTHER

## 2024-01-25 ENCOUNTER — APPOINTMENT (OUTPATIENT)
Dept: GASTROENTEROLOGY | Facility: CLINIC | Age: 49
End: 2024-01-25
Payer: COMMERCIAL

## 2024-01-30 ENCOUNTER — ANESTHESIA EVENT (OUTPATIENT)
Dept: OPERATING ROOM | Facility: HOSPITAL | Age: 49
End: 2024-01-30
Payer: COMMERCIAL

## 2024-01-31 ENCOUNTER — APPOINTMENT (OUTPATIENT)
Dept: RADIOLOGY | Facility: HOSPITAL | Age: 49
End: 2024-01-31
Payer: COMMERCIAL

## 2024-01-31 ENCOUNTER — ANESTHESIA (OUTPATIENT)
Dept: OPERATING ROOM | Facility: HOSPITAL | Age: 49
End: 2024-01-31
Payer: COMMERCIAL

## 2024-01-31 ENCOUNTER — HOSPITAL ENCOUNTER (OUTPATIENT)
Facility: HOSPITAL | Age: 49
Setting detail: OUTPATIENT SURGERY
Discharge: HOME | End: 2024-01-31
Attending: SURGERY | Admitting: SURGERY
Payer: COMMERCIAL

## 2024-01-31 VITALS
HEIGHT: 76 IN | RESPIRATION RATE: 16 BRPM | TEMPERATURE: 97.2 F | HEART RATE: 92 BPM | BODY MASS INDEX: 35.38 KG/M2 | OXYGEN SATURATION: 96 % | WEIGHT: 290.57 LBS | DIASTOLIC BLOOD PRESSURE: 81 MMHG | SYSTOLIC BLOOD PRESSURE: 140 MMHG

## 2024-01-31 DIAGNOSIS — K80.20 GALLSTONES: Primary | ICD-10-CM

## 2024-01-31 DIAGNOSIS — R10.11 RIGHT UPPER QUADRANT ABDOMINAL PAIN: ICD-10-CM

## 2024-01-31 LAB
ABO GROUP (TYPE) IN BLOOD: NORMAL
ANTIBODY SCREEN: NORMAL
RH FACTOR (ANTIGEN D): NORMAL

## 2024-01-31 PROCEDURE — 2500000005 HC RX 250 GENERAL PHARMACY W/O HCPCS: Performed by: SURGERY

## 2024-01-31 PROCEDURE — 2500000005 HC RX 250 GENERAL PHARMACY W/O HCPCS

## 2024-01-31 PROCEDURE — 2720000007 HC OR 272 NO HCPCS: Performed by: SURGERY

## 2024-01-31 PROCEDURE — 2500000004 HC RX 250 GENERAL PHARMACY W/ HCPCS (ALT 636 FOR OP/ED)

## 2024-01-31 PROCEDURE — 7100000009 HC PHASE TWO TIME - INITIAL BASE CHARGE: Performed by: SURGERY

## 2024-01-31 PROCEDURE — 3700000002 HC GENERAL ANESTHESIA TIME - EACH INCREMENTAL 1 MINUTE: Performed by: SURGERY

## 2024-01-31 PROCEDURE — 2500000001 HC RX 250 WO HCPCS SELF ADMINISTERED DRUGS (ALT 637 FOR MEDICARE OP)

## 2024-01-31 PROCEDURE — 88304 TISSUE EXAM BY PATHOLOGIST: CPT

## 2024-01-31 PROCEDURE — 7100000002 HC RECOVERY ROOM TIME - EACH INCREMENTAL 1 MINUTE: Performed by: SURGERY

## 2024-01-31 PROCEDURE — 3600000009 HC OR TIME - EACH INCREMENTAL 1 MINUTE - PROCEDURE LEVEL FOUR: Performed by: SURGERY

## 2024-01-31 PROCEDURE — 2500000004 HC RX 250 GENERAL PHARMACY W/ HCPCS (ALT 636 FOR OP/ED): Performed by: ANESTHESIOLOGY

## 2024-01-31 PROCEDURE — A47562 PR LAP,CHOLECYSTECTOMY

## 2024-01-31 PROCEDURE — 7100000001 HC RECOVERY ROOM TIME - INITIAL BASE CHARGE: Performed by: SURGERY

## 2024-01-31 PROCEDURE — 3600000004 HC OR TIME - INITIAL BASE CHARGE - PROCEDURE LEVEL FOUR: Performed by: SURGERY

## 2024-01-31 PROCEDURE — 88304 TISSUE EXAM BY PATHOLOGIST: CPT | Mod: TC,SUR,AHULAB | Performed by: SURGERY

## 2024-01-31 PROCEDURE — 47562 LAPAROSCOPIC CHOLECYSTECTOMY: CPT | Performed by: SURGERY

## 2024-01-31 PROCEDURE — 7100000010 HC PHASE TWO TIME - EACH INCREMENTAL 1 MINUTE: Performed by: SURGERY

## 2024-01-31 PROCEDURE — A4217 STERILE WATER/SALINE, 500 ML: HCPCS | Performed by: SURGERY

## 2024-01-31 PROCEDURE — 36415 COLL VENOUS BLD VENIPUNCTURE: CPT | Performed by: SURGERY

## 2024-01-31 PROCEDURE — 3700000001 HC GENERAL ANESTHESIA TIME - INITIAL BASE CHARGE: Performed by: SURGERY

## 2024-01-31 PROCEDURE — A47562 PR LAP,CHOLECYSTECTOMY: Performed by: ANESTHESIOLOGY

## 2024-01-31 PROCEDURE — 2500000001 HC RX 250 WO HCPCS SELF ADMINISTERED DRUGS (ALT 637 FOR MEDICARE OP): Performed by: ANESTHESIOLOGY

## 2024-01-31 PROCEDURE — 2500000004 HC RX 250 GENERAL PHARMACY W/ HCPCS (ALT 636 FOR OP/ED): Performed by: SURGERY

## 2024-01-31 PROCEDURE — 86901 BLOOD TYPING SEROLOGIC RH(D): CPT | Performed by: SURGERY

## 2024-01-31 RX ORDER — LIDOCAINE HYDROCHLORIDE 20 MG/ML
INJECTION, SOLUTION EPIDURAL; INFILTRATION; INTRACAUDAL; PERINEURAL AS NEEDED
Status: DISCONTINUED | OUTPATIENT
Start: 2024-01-31 | End: 2024-01-31

## 2024-01-31 RX ORDER — SODIUM CHLORIDE, SODIUM LACTATE, POTASSIUM CHLORIDE, CALCIUM CHLORIDE 600; 310; 30; 20 MG/100ML; MG/100ML; MG/100ML; MG/100ML
100 INJECTION, SOLUTION INTRAVENOUS CONTINUOUS
Status: DISCONTINUED | OUTPATIENT
Start: 2024-01-31 | End: 2024-01-31 | Stop reason: HOSPADM

## 2024-01-31 RX ORDER — DEXAMETHASONE SODIUM PHOSPHATE 4 MG/ML
INJECTION, SOLUTION INTRA-ARTICULAR; INTRALESIONAL; INTRAMUSCULAR; INTRAVENOUS; SOFT TISSUE AS NEEDED
Status: DISCONTINUED | OUTPATIENT
Start: 2024-01-31 | End: 2024-01-31

## 2024-01-31 RX ORDER — OXYCODONE HYDROCHLORIDE 5 MG/1
5 TABLET ORAL EVERY 6 HOURS PRN
Qty: 12 TABLET | Refills: 0 | Status: SHIPPED | OUTPATIENT
Start: 2024-01-31 | End: 2024-03-06 | Stop reason: ALTCHOICE

## 2024-01-31 RX ORDER — OXYCODONE HYDROCHLORIDE 5 MG/1
5 TABLET ORAL EVERY 4 HOURS PRN
Status: DISCONTINUED | OUTPATIENT
Start: 2024-01-31 | End: 2024-01-31 | Stop reason: HOSPADM

## 2024-01-31 RX ORDER — BUPIVACAINE HYDROCHLORIDE 5 MG/ML
INJECTION, SOLUTION PERINEURAL AS NEEDED
Status: DISCONTINUED | OUTPATIENT
Start: 2024-01-31 | End: 2024-01-31 | Stop reason: HOSPADM

## 2024-01-31 RX ORDER — DIPHENHYDRAMINE HYDROCHLORIDE 50 MG/ML
12.5 INJECTION INTRAMUSCULAR; INTRAVENOUS ONCE AS NEEDED
Status: DISCONTINUED | OUTPATIENT
Start: 2024-01-31 | End: 2024-01-31 | Stop reason: HOSPADM

## 2024-01-31 RX ORDER — KETOROLAC TROMETHAMINE 30 MG/ML
INJECTION, SOLUTION INTRAMUSCULAR; INTRAVENOUS AS NEEDED
Status: DISCONTINUED | OUTPATIENT
Start: 2024-01-31 | End: 2024-01-31

## 2024-01-31 RX ORDER — ONDANSETRON HYDROCHLORIDE 2 MG/ML
INJECTION, SOLUTION INTRAVENOUS AS NEEDED
Status: DISCONTINUED | OUTPATIENT
Start: 2024-01-31 | End: 2024-01-31

## 2024-01-31 RX ORDER — ONDANSETRON HYDROCHLORIDE 2 MG/ML
4 INJECTION, SOLUTION INTRAVENOUS ONCE AS NEEDED
Status: COMPLETED | OUTPATIENT
Start: 2024-01-31 | End: 2024-01-31

## 2024-01-31 RX ORDER — FENTANYL CITRATE 50 UG/ML
INJECTION, SOLUTION INTRAMUSCULAR; INTRAVENOUS AS NEEDED
Status: DISCONTINUED | OUTPATIENT
Start: 2024-01-31 | End: 2024-01-31

## 2024-01-31 RX ORDER — ROCURONIUM BROMIDE 10 MG/ML
INJECTION, SOLUTION INTRAVENOUS AS NEEDED
Status: DISCONTINUED | OUTPATIENT
Start: 2024-01-31 | End: 2024-01-31

## 2024-01-31 RX ORDER — HYDROMORPHONE HYDROCHLORIDE 1 MG/ML
INJECTION, SOLUTION INTRAMUSCULAR; INTRAVENOUS; SUBCUTANEOUS AS NEEDED
Status: DISCONTINUED | OUTPATIENT
Start: 2024-01-31 | End: 2024-01-31

## 2024-01-31 RX ORDER — HYDRALAZINE HYDROCHLORIDE 20 MG/ML
5 INJECTION INTRAMUSCULAR; INTRAVENOUS EVERY 30 MIN PRN
Status: DISCONTINUED | OUTPATIENT
Start: 2024-01-31 | End: 2024-01-31 | Stop reason: HOSPADM

## 2024-01-31 RX ORDER — IBUPROFEN 600 MG/1
600 TABLET ORAL EVERY 6 HOURS PRN
Qty: 20 TABLET | Refills: 0 | Status: SHIPPED | OUTPATIENT
Start: 2024-01-31 | End: 2024-03-06 | Stop reason: ALTCHOICE

## 2024-01-31 RX ORDER — SODIUM CHLORIDE 0.9 G/100ML
IRRIGANT IRRIGATION AS NEEDED
Status: DISCONTINUED | OUTPATIENT
Start: 2024-01-31 | End: 2024-01-31 | Stop reason: HOSPADM

## 2024-01-31 RX ORDER — POLYETHYLENE GLYCOL 3350 17 G/17G
17 POWDER, FOR SOLUTION ORAL DAILY PRN
Qty: 10 PACKET | Refills: 0 | Status: SHIPPED | OUTPATIENT
Start: 2024-01-31 | End: 2024-03-06 | Stop reason: ALTCHOICE

## 2024-01-31 RX ORDER — ACETAMINOPHEN 325 MG/1
975 TABLET ORAL ONCE
Status: COMPLETED | OUTPATIENT
Start: 2024-01-31 | End: 2024-01-31

## 2024-01-31 RX ORDER — CEFAZOLIN 1 G/1
INJECTION, POWDER, FOR SOLUTION INTRAVENOUS AS NEEDED
Status: DISCONTINUED | OUTPATIENT
Start: 2024-01-31 | End: 2024-01-31

## 2024-01-31 RX ORDER — MIDAZOLAM HYDROCHLORIDE 1 MG/ML
INJECTION INTRAMUSCULAR; INTRAVENOUS AS NEEDED
Status: DISCONTINUED | OUTPATIENT
Start: 2024-01-31 | End: 2024-01-31

## 2024-01-31 RX ORDER — ALBUTEROL SULFATE 0.83 MG/ML
2.5 SOLUTION RESPIRATORY (INHALATION) ONCE AS NEEDED
Status: DISCONTINUED | OUTPATIENT
Start: 2024-01-31 | End: 2024-01-31 | Stop reason: HOSPADM

## 2024-01-31 RX ORDER — PROPOFOL 10 MG/ML
INJECTION, EMULSION INTRAVENOUS AS NEEDED
Status: DISCONTINUED | OUTPATIENT
Start: 2024-01-31 | End: 2024-01-31

## 2024-01-31 RX ADMIN — SODIUM CHLORIDE, POTASSIUM CHLORIDE, SODIUM LACTATE AND CALCIUM CHLORIDE 100 ML/HR: 600; 310; 30; 20 INJECTION, SOLUTION INTRAVENOUS at 11:20

## 2024-01-31 RX ADMIN — ROCURONIUM BROMIDE 10 MG: 10 INJECTION, SOLUTION INTRAVENOUS at 13:38

## 2024-01-31 RX ADMIN — HYDROMORPHONE HYDROCHLORIDE 0.5 MG: 1 INJECTION, SOLUTION INTRAMUSCULAR; INTRAVENOUS; SUBCUTANEOUS at 14:33

## 2024-01-31 RX ADMIN — PROPOFOL 50 MG: 10 INJECTION, EMULSION INTRAVENOUS at 13:18

## 2024-01-31 RX ADMIN — LIDOCAINE HYDROCHLORIDE 50 MG: 20 INJECTION, SOLUTION EPIDURAL; INFILTRATION; INTRACAUDAL; PERINEURAL at 12:58

## 2024-01-31 RX ADMIN — HYDROMORPHONE HYDROCHLORIDE 0.5 MG: 1 INJECTION, SOLUTION INTRAMUSCULAR; INTRAVENOUS; SUBCUTANEOUS at 13:38

## 2024-01-31 RX ADMIN — PROPOFOL 50 MG: 10 INJECTION, EMULSION INTRAVENOUS at 13:21

## 2024-01-31 RX ADMIN — ROCURONIUM BROMIDE 50 MG: 10 INJECTION, SOLUTION INTRAVENOUS at 12:59

## 2024-01-31 RX ADMIN — SUGAMMADEX 200 MG: 100 INJECTION, SOLUTION INTRAVENOUS at 14:09

## 2024-01-31 RX ADMIN — DEXAMETHASONE SODIUM PHOSPHATE 8 MG: 4 INJECTION, SOLUTION INTRAMUSCULAR; INTRAVENOUS at 13:07

## 2024-01-31 RX ADMIN — ONDANSETRON 4 MG: 2 INJECTION INTRAMUSCULAR; INTRAVENOUS at 13:49

## 2024-01-31 RX ADMIN — HYDROMORPHONE HYDROCHLORIDE 0.5 MG: 1 INJECTION, SOLUTION INTRAMUSCULAR; INTRAVENOUS; SUBCUTANEOUS at 13:50

## 2024-01-31 RX ADMIN — ROCURONIUM BROMIDE 30 MG: 10 INJECTION, SOLUTION INTRAVENOUS at 13:16

## 2024-01-31 RX ADMIN — CEFAZOLIN 3 G: 1 INJECTION, POWDER, FOR SOLUTION INTRAMUSCULAR; INTRAVENOUS at 13:07

## 2024-01-31 RX ADMIN — MIDAZOLAM HYDROCHLORIDE 2 MG: 1 INJECTION, SOLUTION INTRAMUSCULAR; INTRAVENOUS at 12:54

## 2024-01-31 RX ADMIN — ONDANSETRON 4 MG: 2 INJECTION INTRAMUSCULAR; INTRAVENOUS at 14:39

## 2024-01-31 RX ADMIN — PROPOFOL 100 MG: 10 INJECTION, EMULSION INTRAVENOUS at 13:01

## 2024-01-31 RX ADMIN — OXYCODONE HYDROCHLORIDE 5 MG: 5 TABLET ORAL at 15:03

## 2024-01-31 RX ADMIN — KETOROLAC TROMETHAMINE 30 MG: 30 INJECTION, SOLUTION INTRAMUSCULAR; INTRAVENOUS at 13:51

## 2024-01-31 RX ADMIN — ACETAMINOPHEN 975 MG: 325 TABLET ORAL at 11:13

## 2024-01-31 RX ADMIN — HYDROMORPHONE HYDROCHLORIDE 0.5 MG: 1 INJECTION, SOLUTION INTRAMUSCULAR; INTRAVENOUS; SUBCUTANEOUS at 14:50

## 2024-01-31 RX ADMIN — PROPOFOL 200 MG: 10 INJECTION, EMULSION INTRAVENOUS at 12:58

## 2024-01-31 RX ADMIN — HYDROMORPHONE HYDROCHLORIDE 1 MG: 1 INJECTION, SOLUTION INTRAMUSCULAR; INTRAVENOUS; SUBCUTANEOUS at 13:59

## 2024-01-31 RX ADMIN — FENTANYL CITRATE 100 MCG: 50 INJECTION, SOLUTION INTRAMUSCULAR; INTRAVENOUS at 12:58

## 2024-01-31 RX ADMIN — CARBOXYMETHYLCELLULOSE SODIUM 1 DROP: 5 SOLUTION/ DROPS OPHTHALMIC at 13:04

## 2024-01-31 SDOH — HEALTH STABILITY: MENTAL HEALTH: CURRENT SMOKER: 0

## 2024-01-31 ASSESSMENT — PAIN - FUNCTIONAL ASSESSMENT
PAIN_FUNCTIONAL_ASSESSMENT: 0-10

## 2024-01-31 ASSESSMENT — PAIN SCALES - GENERAL
PAINLEVEL_OUTOF10: 7
PAINLEVEL_OUTOF10: 8
PAINLEVEL_OUTOF10: 5 - MODERATE PAIN
PAINLEVEL_OUTOF10: 0 - NO PAIN
PAINLEVEL_OUTOF10: 4
PAINLEVEL_OUTOF10: 4
PAINLEVEL_OUTOF10: 3
PAINLEVEL_OUTOF10: 7

## 2024-01-31 ASSESSMENT — PAIN DESCRIPTION - DESCRIPTORS
DESCRIPTORS: STABBING
DESCRIPTORS: STABBING
DESCRIPTORS: NUMBNESS

## 2024-01-31 ASSESSMENT — COLUMBIA-SUICIDE SEVERITY RATING SCALE - C-SSRS
1. IN THE PAST MONTH, HAVE YOU WISHED YOU WERE DEAD OR WISHED YOU COULD GO TO SLEEP AND NOT WAKE UP?: NO
6. HAVE YOU EVER DONE ANYTHING, STARTED TO DO ANYTHING, OR PREPARED TO DO ANYTHING TO END YOUR LIFE?: NO
2. HAVE YOU ACTUALLY HAD ANY THOUGHTS OF KILLING YOURSELF?: NO

## 2024-01-31 NOTE — OP NOTE
Laparoscopic Cholecystectomy with Cholangiogram Operative Note     Date: 2024  OR Location: [facility] OR    Name: Chano Smith, : 1975, Age: 48 y.o., MRN: [id number], Sex: male    Diagnosis  Pre-op Diagnosis     * Gallstones [K80.20]     * Right upper quadrant abdominal pain [R10.11] Post-op Diagnosis     * Gallstones [K80.20]     * Right upper quadrant abdominal pain [R10.11]     Procedures  Laparoscopic cholecystectomy 08361    Surgeons      * Simon Jonas - Primary    Resident/Fellow/Other Assistant:  Surgeon(s) and Role: PGY 2    Procedure Summary  Anesthesia: General  ASA: II  Anesthesia Staff: Anesthesiologist: Ethan Pond MD  C-AA: ROBBIN Elizabeth  Estimated Blood Loss: 10mL  Intra-op Medications:   Administrations occurring from 1230 to 1400 on 24:   Medication Name Total Dose   sodium chloride 0.9 % irrigation solution 4,000 mL   lactated Ringer's infusion Cannot be calculated              Anesthesia Record               Intraprocedure I/O Totals          Output    Est. Blood Loss 5 mL    Total Output 5 mL          Specimen:   ID Type Source Tests Collected by Time   1 : GALLBLADDER WITH CONTENTS Tissue GALLBLADDER CHOLECYSTECTOMY SURGICAL PATHOLOGY EXAM Simon Jonas MD 2024 1348        Staff:   Circulator: Lashonda Funez RN; Daron Gongora RN; Jenn Stevens RN  Scrub Person: [facility]         Drains and/or Catheters: * None in log *    Tourniquet Times:         Implants:     Findings: Edematous gallbladder.  Narrow cystic duct.    Indications: Chano Smith is an 48 y.o. male who is having surgery for Gallstones [K80.20]  Right upper quadrant abdominal pain [R10.11].     The patient was seen in the preoperative area. The risks, benefits, complications, treatment options, non-operative alternatives, expected recovery and outcomes were discussed with the patient. The possibilities of reaction to medication, pulmonary aspiration, injury to surrounding  structures, bleeding, recurrent infection, the need for additional procedures, failure to diagnose a condition, and creating a complication requiring transfusion or operation were discussed with the patient. The patient concurred with the proposed plan, giving informed consent.  The site of surgery was properly noted/marked if necessary per policy. The patient has been actively warmed in preoperative area. Preoperative antibiotics have been ordered and given within 1 hours of incision. Venous thrombosis prophylaxis have been ordered including bilateral sequential compression devices and unilateral sequential compression device    Procedure Details: Patient was brought to the OR and placed supine on the table.  Time out was performed and we confirmed patient and procedure.  Niall operative antibiotics were administered. General anesthesia given through ET tube.  We then prepped and draped sterilely.      I made an niall-umbilical incision with scalpel and then entered the peritoneal cavity by open cut down technique.  Stay sutures of 0 vicryl placed.  Michael trocar inserted directly.  We insufflated and placed 5mm, 30 degree camera.  Patient placed in reverse Trendelenburg position.  5 mm ports placed under direct visualization in midline sub xiphoid and RUQ areas.      Gallbladder was visualized.  Some inflammatory adhesions taken down with blunt dissection and judicious cautery.  I was able to grab the body of the gallbladder and retract cephalad and to the right.  Meticulous dissection then performed in Calot's triangle.  Cystic duct and cystic artery structures were identified and skeletonized.  Posterior cystic plate visualized.  The critical view was achieved.  The cystic artery was divided between hemolock clips.  Three clips placed on proximal cystic duct and one toward the gallbladder side.  We divided in between.  The gallbladder was then dissected atraumatically out of the liver bed with hook cautery.  It  was then placed in the endocatch bag and extracted ultimately through umbilical port site.  The liver bed was inspected for hemostasis.  The maira hepatis was noted to be without bleeding or bile leakage.  Clips well secured.  We gently irrigated and aspirated the effluent.  Some Riley topical hemostatic agent was sprayed in the liver bed.  The ports were removed under direct visualization.  We desufflated and closed the umbilical fascia with 0 vicryl.  Skin incisions were closed with 4-0 biosyn and dermabond.      Patient was extubated and transferred to the PACU in stable condition.    Complications:  None; patient tolerated the procedure well.    Disposition: PACU - hemodynamically stable.  Condition: stable         Additional Details:     Attending Attestation: I was present for the entire procedure.    Simon Jonas  Phone Number: [phone]

## 2024-01-31 NOTE — ANESTHESIA PROCEDURE NOTES
Airway  Date/Time: 1/31/2024 1:03 PM  Urgency: elective    Airway not difficult    Staffing  Performed: ROBBIN   Authorized by: Ethan Pond MD    Performed by: ROBBIN Elizabeth  Patient location during procedure: OR    Indications and Patient Condition  Indications for airway management: anesthesia  Spontaneous Ventilation: absent  Sedation level: deep  Preoxygenated: yes  Patient position: sniffing  Mask difficulty assessment: 1 - vent by mask  No planned trial extubation    Final Airway Details  Final airway type: endotracheal airway      Successful airway: ETT  Cuffed: yes   Successful intubation technique: direct laryngoscopy  Facilitating devices/methods: intubating stylet  Blade: Imtiaz  Blade size: #4  ETT size (mm): 8.0  Cormack-Lehane Classification: grade IIb - view of arytenoids or posterior of glottis only  Placement verified by: chest auscultation and capnometry   Cuff volume (mL): 7  Measured from: lips  ETT to lips (cm): 24  Number of attempts at approach: 1    Additional Comments  atraumatic

## 2024-01-31 NOTE — ANESTHESIA PREPROCEDURE EVALUATION
Patient: Chano Smith    Procedure Information       Date/Time: 01/31/24 1230    Procedure: Laparoscopic Cholecystectomy with Cholangiogram (Abdomen)    Location: [facility] OR 04 / [facility] OR    Surgeons: Simon Jonas MD            Relevant Problems   Anesthesia (within normal limits)      Cardiovascular   (+) Hypertriglyceridemia   (+) Primary hypertension   (+) Rib pain on right side      Endocrine   (+) Class 1 obesity with body mass index (BMI) of 34.0 to 34.9 in adult   (+) Class 2 severe obesity with serious comorbidity and body mass index (BMI) of 35.0 to 35.9 in adult (CMS/HCC)      /Renal   (+) Alcoholic liver disease (CMS/HCC)   (+) Fatty liver      Neuro/Psych   (+) Anxiety   (+) Depression   (+) Left lumbar radiculopathy      GI/Hepatic   (+) Alcoholic liver disease (CMS/HCC)   (+) Fatty liver   (+) Gallstones      Hematology   (+) Thrombocytopenia (CMS/HCC)       Clinical information reviewed:   Tobacco  Allergies  Meds   Med Hx  Surg Hx   Fam Hx  Soc Hx        NPO Detail:  NPO/Void Status  Date of Last Liquid: 01/31/24  Time of Last Liquid: 0800  Date of Last Solid: 01/30/24  Time of Last Solid: 1700         Physical Exam    Airway  Mallampati: II  TM distance: >3 FB  Neck ROM: full     Cardiovascular    Dental    Pulmonary    Abdominal            Anesthesia Plan    History of general anesthesia?: yes  History of complications of general anesthesia?: no    ASA 2     general     The patient is not a current smoker.    intravenous induction   Anesthetic plan and risks discussed with patient and spouse.    Plan discussed with CAA.

## 2024-01-31 NOTE — NURSING NOTE
1417: Patient to bay with anesthesia and surgical team present. Handoff report and plan of care reviewed, all questions answered. VSS.    1430: Patient family updated via text message    1433: 0.5mg dilaudid administered per given orders, allergies verified prior to administration    1439: 4mg zofran administered per given orders, allergies verified prior to administration    1450: 0.5 mg dilaudid administered per given orders, allergies verified prior to administration. Handoff to Nora VERONICA for lunch break    1320: Resumed care for patient at this time. Pt received 5mg oxycodone for pain at 1504, pt tolerating PO.    1344: Patient meets phase 1 discharge criteria at this time    1345: Patient to phase 2, handoff given to Jose Antonio VERONICA

## 2024-01-31 NOTE — H&P
Progress Notes  Simon Jonas MD (Physician)  General Surgery  Expand All Collapse All  .History Of Present Illness  Chano Smith is a 48 y.o. male presenting with 1 year history of postprandial bloating nausea diarrhea right upper quadrant abdominal pains.  Earlier this year he had some imaging showing gallstones and sludge.  He also has a history of transaminitis since been fairly consistent.  He has been able to mitigate his symptoms somewhat by altering his dietary intake greasy foods, cheeses and spicy foods.     History of hypertension mood disorder.  Lives with wife and 2 children.  He works for the [employer].  Non-smoker.  Formally overindulged in alcohol however he does not drink much at all anymore     Past Medical History  Medical History        Past Medical History:   Diagnosis Date    Allergic rhinitis due to dust mite 05/08/2023    Allergic rhinitis due to pollen 05/08/2023    Hypomagnesemia 05/08/2023    Peroneal tendonitis of left lower extremity 05/08/2023            Surgical History  Surgical History         Past Surgical History:   Procedure Laterality Date    OTHER SURGICAL HISTORY   02/10/2021     Tonsillectomy    OTHER SURGICAL HISTORY   02/10/2021     Vasectomy            Social History  He reports that he has never smoked. He has never been exposed to tobacco smoke. He quit smokeless tobacco use about 17 years ago.  His smokeless tobacco use included chew. No history on file for alcohol use and drug use.     Family History  Family History          Family History   Problem Relation Name Age of Onset    No Known Problems Mother        Lung cancer Father        Diverticulitis Father        ALS Sister        Irritable bowel syndrome Sister        REYNOLD disease Brother        Diverticulosis Father's Sister        Macular degeneration Paternal Grandmother                Allergies  Acrylic acid and Cortisone     Review of Systems  Constitutional: no weight loss, no fevers, no  "malaise  HEENT: negative  Neck: negative  Pulmonary: no SOB, no cough  CV: no chest pain, otherwise negative  GI: See HPI  : no hematuria, retention.  MS: no aches/pains  Neurologic: negative  Skin: no rashes, lesions  HEME: no bleeding tendency, no bruising  Psych: no mood issues     Physical Exam     Last Recorded Vitals  Height 1.93 m (6' 4\"), weight 135 kg (298 lb 3.2 oz).     Relevant Results            Show images for US abdomen limited liver  Signed by     Signed Time Phone Pager   Kedar Martinez MD 11/21/2023 18:43 [phone]        Exam Information     Status Exam Begun Exam Ended   Final 11/20/2023 08:35 11/20/2023 09:11      Study Result     Narrative & Impression   Interpreted By:  Kedar Martinez,   STUDY:  US ABDOMEN LIMITED LIVER;  11/20/2023 9:11 am      INDICATION:  Signs/Symptoms:Hx gallstones and sludge (ER CT 7/19/22)   abdominal  pain epigastric, Nausea, vomiting, bowel changes.      COMPARISON:  None.      ACCESSION NUMBER(S):  [id number]      ORDERING CLINICIAN:  TRICIA CURIEL      TECHNIQUE:  Multiple sonographic images of the right upper quadrant were  obtained. Static images were obtained for remote interpretation.      FINDINGS:  LIVER:  The liver is enlarged, 21.5 cm in length. The hepatic parenchyma is  diffusely echogenic consistent with steatosis. The resultant beam  attenuation limits the assessment for focal lesions, but within this  limitation, no focal lesion is evident. The surface contour is not  frankly nodular.      GALLBLADDER:  The gallbladder is non-distended and contains gallstones and sludge.  No wall thickening or pericholecystic edema.      BILE DUCTS:  No evidence of intra or extrahepatic biliary dilatation is  identified; the common bile duct measures 6 mm.      PANCREAS:  The pancreas is poorly visualized due to overlying bowel gas.      KIDNEYS:  Suboptimally visualized due to in attenuation from the fatty liver.  Right kidney is normal size, 12.9 cm in " length. Renal cortical  thickness and echogenicity are grossly normal. No hydronephrosis or  nephrolithiasis evident.      PERITONEUM:  There is no free or loculated fluid seen in the abdomen.      IMPRESSION:  1. The gallbladder contains stones and sludge. No gallbladder wall  thickening or pericholecystic edema to indicate acute cholecystitis.  2. Hepatomegaly and steatosis.      MACRO:  None      Signed by: Kedar Martinez 11/21/2023 6:43 PM  Dictation workstation:   [id number]    Contains abnormal data Gamma GT  Order: [id number]  Status: Final result       Visible to patient: Yes (seen)       Dx: Abnormal liver function    0 Result Notes       1 Patient Communication        Component  Ref Range & Units 3 wk ago   GGT  5 - 64 U/L 681 High                Assessment/Plan   Impression:  Symptomatic Cholelithiasis     -I carefully explained the pathophysiology of biliary tract disease using terms and pictures  -Rationale for surgical intervention described  -Technique of laparoscopic cholecystectomy explained (both standard and robotic assisted)  -Risks of surgery elucidated (bleeding, infection, bile leak, CBD injury, etc)  -Other options presented (watchful waiting, avoidance of fatty foods)  -All patient questions answered to his satisfaction.  -Patient has indicated desire to pursue surgical intervention  -Office will arrange at patient convenience.           I spent 40 minutes in the professional and overall care of this patient.        Simon Jonas MD

## 2024-01-31 NOTE — ANESTHESIA POSTPROCEDURE EVALUATION
Patient: Chano Smith    Procedure Summary       Date: 01/31/24 Room / Location: [facility] OR 04 / [facility]    Anesthesia Start: 1253 Anesthesia Stop: 1419    Procedure: Laparoscopic Cholecystectomy with Cholangiogram (Abdomen) Diagnosis:       Gallstones      Right upper quadrant abdominal pain      (Gallstones [K80.20])      (Right upper quadrant abdominal pain [R10.11])    Surgeons: Simon Jonas MD Responsible Provider: Ethan Pond MD    Anesthesia Type: general ASA Status: 2            Anesthesia Type: general    Vitals Value Taken Time   BP  01/31/24 1422   Temp  01/31/24 1422   Pulse 83 01/31/24 1422   Resp  01/31/24 1422   SpO2 91 % 01/31/24 1422   Vitals shown include unvalidated device data.    Anesthesia Post Evaluation    Patient location during evaluation: PACU  Patient participation: complete - patient participated  Level of consciousness: awake  Pain management: adequate  Multimodal analgesia pain management approach  Airway patency: patent  Cardiovascular status: acceptable  Respiratory status: acceptable  Hydration status: acceptable  Postoperative Nausea and Vomiting: none  Comments: Will continue to assess PONV status.        No notable events documented.

## 2024-02-05 LAB
LABORATORY COMMENT REPORT: NORMAL
PATH REPORT.FINAL DX SPEC: NORMAL
PATH REPORT.GROSS SPEC: NORMAL
PATH REPORT.RELEVANT HX SPEC: NORMAL
PATH REPORT.TOTAL CANCER: NORMAL

## 2024-02-15 ENCOUNTER — OFFICE VISIT (OUTPATIENT)
Dept: SURGERY | Facility: CLINIC | Age: 49
End: 2024-02-15
Payer: COMMERCIAL

## 2024-02-15 VITALS
WEIGHT: 290.6 LBS | TEMPERATURE: 95.9 F | HEIGHT: 76 IN | BODY MASS INDEX: 35.39 KG/M2 | SYSTOLIC BLOOD PRESSURE: 145 MMHG | DIASTOLIC BLOOD PRESSURE: 104 MMHG | HEART RATE: 106 BPM

## 2024-02-15 DIAGNOSIS — Z09 SURGERY FOLLOW-UP: Primary | ICD-10-CM

## 2024-02-15 PROCEDURE — 3008F BODY MASS INDEX DOCD: CPT | Performed by: SURGERY

## 2024-02-15 PROCEDURE — 3080F DIAST BP >= 90 MM HG: CPT | Performed by: SURGERY

## 2024-02-15 PROCEDURE — 3077F SYST BP >= 140 MM HG: CPT | Performed by: SURGERY

## 2024-02-15 PROCEDURE — 1036F TOBACCO NON-USER: CPT | Performed by: SURGERY

## 2024-02-15 PROCEDURE — 99024 POSTOP FOLLOW-UP VISIT: CPT | Performed by: SURGERY

## 2024-02-15 NOTE — LETTER
February 15, 2024     Patient: Chano Smith   YOB: 1975   Date of Visit: 2/15/2024       To Whom It May Concern:    It is my medical opinion that Chano Smith may return to work on February 20, 2024.  He has been recovering from recent abdominal surgery .    If you have any questions or concerns, please don't hesitate to call.         Sincerely,        Simon Jonas MD    CC: No Recipients

## 2024-02-15 NOTE — LETTER
February 15, 2024     Aleah Donahue, APRN-CNP  [address]  [facility], [address]  [facility] [address]    Patient: Chaon Smith   YOB: 1975   Date of Visit: 2/15/2024       Dear Dr. Aleah Donahue, APRN-CNP:    Thank you for referring Chano Smith to me for evaluation. Below are my notes for this consultation.  If you have questions, please do not hesitate to call me. I look forward to following your patient along with you.       Sincerely,     Simon Jonas MD      CC: No Recipients  ______________________________________________________________________________________    Assessment/Plan  I reassured him that some of his symptoms he is experiencing are not to be expected.  The fatigue and diarrhea should improve with time.  He is going to get some Imodium to have in case the diarrhea worsens.  Dietary modifications were reviewed.  We also discussed his final pathology report.  He is going to call me in 2 weeks if his symptoms persist    Subjective  Status post laparoscopic cholecystectomy for acalculous acute cholecystitis.  He has been experiencing intermittent diarrhea.  Also having abdominal pain and fatigue.  He does want to go back to work next week though       Objective    Physical Exam  NAD  A&Ox3  Non icteric  CTA  RR  Abdomen soft min tender. Wounds clean, intact  Extremities warm, well perfused         Relevant Results    Result Notes       Component  Resulting Agency   FINAL DIAGNOSIS   Gallbladder, cholecystectomy:  - Acute and chronic cholecystitis.  - One benign lymph node.      Electronically signed by Shama Daniel MD on 2/5/2024 at 1050      [facility]   By the signature on this report, the individual or group listed as making the Final Interpretation/Diagnosis certifies that they have reviewed this case.    Clinical History  [facility] [place] [facility]   Pre-op diagnosis:  Gallstones [K80.20]  Right upper quadrant abdominal pain [R10.11]   Gross Description  [facility]   [id number]  Received in  "formalin, labeled with the patient's name and hospital number and \" gallbladder\", is an intact gallbladder, measuring 11.0 x 5.2 x 5.0 cm.  The serosal surface is smooth, and glistening.  The wall measures up to 0.2 cm in greatest thickness.   A calculus is not identified.  The mucosal surface is bile-stained.  Representative sections consisting of the cystic duct margin, and  gallbladder wall, are submitted in 1  cassette.  [facility]  Department of Pathology  [address] [facility]        No results found for this or any previous visit (from the past 24 hour(s)).        I spent 25 minutes in the professional and overall care of this patient.      Simon Jonas MD      "

## 2024-02-15 NOTE — PROGRESS NOTES
"Assessment/Plan   I reassured him that some of his symptoms he is experiencing are not to be expected.  The fatigue and diarrhea should improve with time.  He is going to get some Imodium to have in case the diarrhea worsens.  Dietary modifications were reviewed.  We also discussed his final pathology report.  He is going to call me in 2 weeks if his symptoms persist    Subjective   Status post laparoscopic cholecystectomy for acalculous acute cholecystitis.  He has been experiencing intermittent diarrhea.  Also having abdominal pain and fatigue.  He does want to go back to work next week though       Objective     Physical Exam  NAD  A&Ox3  Non icteric  CTA  RR  Abdomen soft min tender. Wounds clean, intact  Extremities warm, well perfused         Relevant Results    Result Notes       Component  Resulting Agency   FINAL DIAGNOSIS   Gallbladder, cholecystectomy:  - Acute and chronic cholecystitis.  - One benign lymph node.      Electronically signed by Shama Daniel MD on 2/5/2024 at 1050      [facility]   By the signature on this report, the individual or group listed as making the Final Interpretation/Diagnosis certifies that they have reviewed this case.    Clinical History  [facility]   Pre-op diagnosis:  Gallstones [K80.20]  Right upper quadrant abdominal pain [R10.11]   Gross Description  [facility]   [id number]  Received in formalin, labeled with the patient's name and hospital number and \" gallbladder\", is an intact gallbladder, measuring 11.0 x 5.2 x 5.0 cm.  The serosal surface is smooth, and glistening.  The wall measures up to 0.2 cm in greatest thickness.   A calculus is not identified.  The mucosal surface is bile-stained.  Representative sections consisting of the cystic duct margin, and  gallbladder wall, are submitted in 1  cassette.  XL     [facility]  Department of Pathology  [address]        No results found for this or any previous visit (from the past 24 " hour(s)).        I spent 25 minutes in the professional and overall care of this patient.      Simon Jonas MD

## 2024-03-06 ENCOUNTER — OFFICE VISIT (OUTPATIENT)
Dept: PRIMARY CARE | Facility: CLINIC | Age: 49
End: 2024-03-06
Payer: COMMERCIAL

## 2024-03-06 ENCOUNTER — APPOINTMENT (OUTPATIENT)
Dept: RADIOLOGY | Facility: HOSPITAL | Age: 49
End: 2024-03-06
Payer: COMMERCIAL

## 2024-03-06 ENCOUNTER — APPOINTMENT (OUTPATIENT)
Dept: CARDIOLOGY | Facility: HOSPITAL | Age: 49
End: 2024-03-06
Payer: COMMERCIAL

## 2024-03-06 ENCOUNTER — HOSPITAL ENCOUNTER (EMERGENCY)
Facility: HOSPITAL | Age: 49
Discharge: HOME | End: 2024-03-06
Attending: EMERGENCY MEDICINE
Payer: COMMERCIAL

## 2024-03-06 VITALS
HEIGHT: 76 IN | HEART RATE: 97 BPM | OXYGEN SATURATION: 95 % | BODY MASS INDEX: 34.1 KG/M2 | DIASTOLIC BLOOD PRESSURE: 111 MMHG | SYSTOLIC BLOOD PRESSURE: 168 MMHG | TEMPERATURE: 98.6 F | RESPIRATION RATE: 16 BRPM | WEIGHT: 280 LBS

## 2024-03-06 VITALS
TEMPERATURE: 96.6 F | BODY MASS INDEX: 34.93 KG/M2 | DIASTOLIC BLOOD PRESSURE: 111 MMHG | OXYGEN SATURATION: 96 % | HEART RATE: 98 BPM | SYSTOLIC BLOOD PRESSURE: 150 MMHG | WEIGHT: 287 LBS

## 2024-03-06 DIAGNOSIS — R10.11 RUQ ABDOMINAL PAIN: ICD-10-CM

## 2024-03-06 DIAGNOSIS — E87.6 HYPOKALEMIA: Primary | ICD-10-CM

## 2024-03-06 DIAGNOSIS — R11.2 NAUSEA AND VOMITING, UNSPECIFIED VOMITING TYPE: ICD-10-CM

## 2024-03-06 DIAGNOSIS — R10.11 RIGHT UPPER QUADRANT ABDOMINAL PAIN: Primary | ICD-10-CM

## 2024-03-06 LAB
ALBUMIN SERPL BCP-MCNC: 4.5 G/DL (ref 3.4–5)
ALP SERPL-CCNC: 92 U/L (ref 33–120)
ALT SERPL W P-5'-P-CCNC: 89 U/L (ref 10–52)
ANION GAP SERPL CALC-SCNC: 15 MMOL/L (ref 10–20)
APPEARANCE UR: CLEAR
AST SERPL W P-5'-P-CCNC: 67 U/L (ref 9–39)
BASOPHILS # BLD AUTO: 0.04 X10*3/UL (ref 0–0.1)
BASOPHILS NFR BLD AUTO: 0.6 %
BILIRUB SERPL-MCNC: 1.7 MG/DL (ref 0–1.2)
BILIRUB UR STRIP.AUTO-MCNC: NEGATIVE MG/DL
BUN SERPL-MCNC: 12 MG/DL (ref 6–23)
CALCIUM SERPL-MCNC: 9.5 MG/DL (ref 8.6–10.3)
CHLORIDE SERPL-SCNC: 94 MMOL/L (ref 98–107)
CO2 SERPL-SCNC: 26 MMOL/L (ref 21–32)
COLOR UR: YELLOW
CREAT SERPL-MCNC: 1 MG/DL (ref 0.5–1.3)
EGFRCR SERPLBLD CKD-EPI 2021: >90 ML/MIN/1.73M*2
EOSINOPHIL # BLD AUTO: 0.01 X10*3/UL (ref 0–0.7)
EOSINOPHIL NFR BLD AUTO: 0.1 %
ERYTHROCYTE [DISTWIDTH] IN BLOOD BY AUTOMATED COUNT: 12.2 % (ref 11.5–14.5)
GLUCOSE SERPL-MCNC: 121 MG/DL (ref 74–99)
GLUCOSE UR STRIP.AUTO-MCNC: NEGATIVE MG/DL
HCT VFR BLD AUTO: 46.2 % (ref 41–52)
HGB BLD-MCNC: 16.7 G/DL (ref 13.5–17.5)
IMM GRANULOCYTES # BLD AUTO: 0.03 X10*3/UL (ref 0–0.7)
IMM GRANULOCYTES NFR BLD AUTO: 0.4 % (ref 0–0.9)
KETONES UR STRIP.AUTO-MCNC: ABNORMAL MG/DL
LEUKOCYTE ESTERASE UR QL STRIP.AUTO: NEGATIVE
LIPASE SERPL-CCNC: 53 U/L (ref 9–82)
LYMPHOCYTES # BLD AUTO: 2.43 X10*3/UL (ref 1.2–4.8)
LYMPHOCYTES NFR BLD AUTO: 33.8 %
MCH RBC QN AUTO: 32.8 PG (ref 26–34)
MCHC RBC AUTO-ENTMCNC: 36.1 G/DL (ref 32–36)
MCV RBC AUTO: 91 FL (ref 80–100)
MONOCYTES # BLD AUTO: 0.42 X10*3/UL (ref 0.1–1)
MONOCYTES NFR BLD AUTO: 5.8 %
MUCOUS THREADS #/AREA URNS AUTO: NORMAL /LPF
NEUTROPHILS # BLD AUTO: 4.26 X10*3/UL (ref 1.2–7.7)
NEUTROPHILS NFR BLD AUTO: 59.3 %
NITRITE UR QL STRIP.AUTO: NEGATIVE
NRBC BLD-RTO: 0 /100 WBCS (ref 0–0)
PH UR STRIP.AUTO: 6 [PH]
PLATELET # BLD AUTO: 168 X10*3/UL (ref 150–450)
POTASSIUM SERPL-SCNC: 3 MMOL/L (ref 3.5–5.3)
PROT SERPL-MCNC: 7.7 G/DL (ref 6.4–8.2)
PROT UR STRIP.AUTO-MCNC: ABNORMAL MG/DL
RBC # BLD AUTO: 5.09 X10*6/UL (ref 4.5–5.9)
RBC # UR STRIP.AUTO: ABNORMAL /UL
RBC #/AREA URNS AUTO: NORMAL /HPF
SODIUM SERPL-SCNC: 132 MMOL/L (ref 136–145)
SP GR UR STRIP.AUTO: 1.02
UROBILINOGEN UR STRIP.AUTO-MCNC: <2 MG/DL
WBC # BLD AUTO: 7.2 X10*3/UL (ref 4.4–11.3)
WBC #/AREA URNS AUTO: NORMAL /HPF

## 2024-03-06 PROCEDURE — 96360 HYDRATION IV INFUSION INIT: CPT | Mod: 59

## 2024-03-06 PROCEDURE — 81001 URINALYSIS AUTO W/SCOPE: CPT | Performed by: EMERGENCY MEDICINE

## 2024-03-06 PROCEDURE — 93005 ELECTROCARDIOGRAM TRACING: CPT

## 2024-03-06 PROCEDURE — 2500000002 HC RX 250 W HCPCS SELF ADMINISTERED DRUGS (ALT 637 FOR MEDICARE OP, ALT 636 FOR OP/ED): Performed by: EMERGENCY MEDICINE

## 2024-03-06 PROCEDURE — 1036F TOBACCO NON-USER: CPT | Performed by: NURSE PRACTITIONER

## 2024-03-06 PROCEDURE — 85025 COMPLETE CBC W/AUTO DIFF WBC: CPT | Performed by: EMERGENCY MEDICINE

## 2024-03-06 PROCEDURE — 71045 X-RAY EXAM CHEST 1 VIEW: CPT | Mod: FOREIGN READ | Performed by: RADIOLOGY

## 2024-03-06 PROCEDURE — 3080F DIAST BP >= 90 MM HG: CPT | Performed by: NURSE PRACTITIONER

## 2024-03-06 PROCEDURE — 3008F BODY MASS INDEX DOCD: CPT | Performed by: NURSE PRACTITIONER

## 2024-03-06 PROCEDURE — 2550000001 HC RX 255 CONTRASTS: Performed by: EMERGENCY MEDICINE

## 2024-03-06 PROCEDURE — 36415 COLL VENOUS BLD VENIPUNCTURE: CPT | Performed by: EMERGENCY MEDICINE

## 2024-03-06 PROCEDURE — 99285 EMERGENCY DEPT VISIT HI MDM: CPT | Mod: 25

## 2024-03-06 PROCEDURE — 71045 X-RAY EXAM CHEST 1 VIEW: CPT

## 2024-03-06 PROCEDURE — 74177 CT ABD & PELVIS W/CONTRAST: CPT

## 2024-03-06 PROCEDURE — 80053 COMPREHEN METABOLIC PANEL: CPT | Performed by: EMERGENCY MEDICINE

## 2024-03-06 PROCEDURE — 83690 ASSAY OF LIPASE: CPT | Performed by: EMERGENCY MEDICINE

## 2024-03-06 PROCEDURE — 2500000004 HC RX 250 GENERAL PHARMACY W/ HCPCS (ALT 636 FOR OP/ED): Performed by: EMERGENCY MEDICINE

## 2024-03-06 PROCEDURE — 74177 CT ABD & PELVIS W/CONTRAST: CPT | Mod: FOREIGN READ | Performed by: RADIOLOGY

## 2024-03-06 PROCEDURE — 3077F SYST BP >= 140 MM HG: CPT | Performed by: NURSE PRACTITIONER

## 2024-03-06 PROCEDURE — 99214 OFFICE O/P EST MOD 30 MIN: CPT | Performed by: NURSE PRACTITIONER

## 2024-03-06 RX ORDER — NALTREXONE HYDROCHLORIDE 50 MG/1
50 TABLET, FILM COATED ORAL DAILY
COMMUNITY
Start: 2024-01-12

## 2024-03-06 RX ORDER — ONDANSETRON 4 MG/1
4 TABLET, FILM COATED ORAL EVERY 6 HOURS
Qty: 12 TABLET | Refills: 0 | Status: SHIPPED | OUTPATIENT
Start: 2024-03-06 | End: 2024-03-09

## 2024-03-06 RX ORDER — POTASSIUM CHLORIDE 20 MEQ/1
40 TABLET, EXTENDED RELEASE ORAL ONCE
Status: COMPLETED | OUTPATIENT
Start: 2024-03-06 | End: 2024-03-06

## 2024-03-06 RX ADMIN — POTASSIUM CHLORIDE 40 MEQ: 1500 TABLET, EXTENDED RELEASE ORAL at 15:12

## 2024-03-06 RX ADMIN — IOHEXOL 75 ML: 350 INJECTION, SOLUTION INTRAVENOUS at 15:19

## 2024-03-06 RX ADMIN — SODIUM CHLORIDE, POTASSIUM CHLORIDE, SODIUM LACTATE AND CALCIUM CHLORIDE 1000 ML: 600; 310; 30; 20 INJECTION, SOLUTION INTRAVENOUS at 16:07

## 2024-03-06 ASSESSMENT — ENCOUNTER SYMPTOMS
FEVER: 0
DYSURIA: 0
NAUSEA: 1
FLATUS: 0
VOMITING: 1
ANOREXIA: 1
BLOOD IN STOOL: 0
ABDOMINAL PAIN: 1
CONSTIPATION: 0
SHORTNESS OF BREATH: 0
BELCHING: 1
DIARRHEA: 1
MYALGIAS: 1
PALPITATIONS: 0
CHILLS: 1
ARTHRALGIAS: 0
FATIGUE: 0
HEMATOCHEZIA: 0
FREQUENCY: 0
HEADACHES: 0
HEMATURIA: 0
WEIGHT LOSS: 0

## 2024-03-06 ASSESSMENT — PAIN SCALES - GENERAL: PAINLEVEL_OUTOF10: 6

## 2024-03-06 ASSESSMENT — PAIN DESCRIPTION - LOCATION: LOCATION: ABDOMEN

## 2024-03-06 ASSESSMENT — PAIN - FUNCTIONAL ASSESSMENT: PAIN_FUNCTIONAL_ASSESSMENT: 0-10

## 2024-03-06 NOTE — DISCHARGE INSTRUCTIONS
You have been seen at a [facility].  Please follow-up with your primary care provider in the next 1 to 2 days for further evaluation and routine follow-up.  Please return to the emergency room if having any worsening symptoms.  Please follow-up with any specialists if discussed during your emergency room stay.
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
## 2024-03-06 NOTE — Clinical Note
Chano Smith was seen and treated in our emergency department on 3/6/2024.  He may return to work on 03/06/2024.       If you have any questions or concerns, please don't hesitate to call.      Rao Frazier, DO
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
## 2024-03-07 LAB
ATRIAL RATE: 95 BPM
P AXIS: 57 DEGREES
P OFFSET: 188 MS
P ONSET: 122 MS
PR INTERVAL: 176 MS
Q ONSET: 210 MS
QRS COUNT: 16 BEATS
QRS DURATION: 112 MS
QT INTERVAL: 368 MS
QTC CALCULATION(BAZETT): 462 MS
QTC FREDERICIA: 428 MS
R AXIS: 50 DEGREES
T AXIS: 24 DEGREES
T OFFSET: 394 MS
VENTRICULAR RATE: 95 BPM
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
## 2024-03-08 DIAGNOSIS — I10 PRIMARY HYPERTENSION: ICD-10-CM

## 2024-03-08 RX ORDER — AMLODIPINE BESYLATE 10 MG/1
10 TABLET ORAL DAILY
Qty: 90 TABLET | Refills: 1 | Status: SHIPPED | OUTPATIENT
Start: 2024-03-08

## 2024-03-20 ENCOUNTER — OFFICE VISIT (OUTPATIENT)
Dept: PRIMARY CARE | Facility: CLINIC | Age: 49
End: 2024-03-20
Payer: COMMERCIAL

## 2024-03-20 ENCOUNTER — LAB (OUTPATIENT)
Dept: LAB | Facility: LAB | Age: 49
End: 2024-03-20
Payer: COMMERCIAL

## 2024-03-20 VITALS
SYSTOLIC BLOOD PRESSURE: 128 MMHG | TEMPERATURE: 97.3 F | OXYGEN SATURATION: 95 % | DIASTOLIC BLOOD PRESSURE: 86 MMHG | WEIGHT: 302.8 LBS | BODY MASS INDEX: 36.86 KG/M2 | HEART RATE: 102 BPM

## 2024-03-20 DIAGNOSIS — E87.6 HYPOKALEMIA: ICD-10-CM

## 2024-03-20 DIAGNOSIS — I10 PRIMARY HYPERTENSION: ICD-10-CM

## 2024-03-20 DIAGNOSIS — E87.1 HYPONATREMIA: ICD-10-CM

## 2024-03-20 DIAGNOSIS — R10.11 RIGHT UPPER QUADRANT ABDOMINAL PAIN: Primary | ICD-10-CM

## 2024-03-20 PROBLEM — M54.2 NECK PAIN: Status: RESOLVED | Noted: 2023-06-26 | Resolved: 2024-03-20

## 2024-03-20 PROBLEM — M62.838 MUSCLE SPASM: Status: RESOLVED | Noted: 2023-04-14 | Resolved: 2024-03-20

## 2024-03-20 LAB
ALBUMIN SERPL BCP-MCNC: 4.4 G/DL (ref 3.4–5)
ALP SERPL-CCNC: 85 U/L (ref 33–120)
ALT SERPL W P-5'-P-CCNC: 67 U/L (ref 10–52)
ANION GAP SERPL CALC-SCNC: 13 MMOL/L (ref 10–20)
AST SERPL W P-5'-P-CCNC: 48 U/L (ref 9–39)
BILIRUB SERPL-MCNC: 0.7 MG/DL (ref 0–1.2)
BUN SERPL-MCNC: 18 MG/DL (ref 6–23)
CALCIUM SERPL-MCNC: 9.9 MG/DL (ref 8.6–10.3)
CHLORIDE SERPL-SCNC: 106 MMOL/L (ref 98–107)
CO2 SERPL-SCNC: 27 MMOL/L (ref 21–32)
CREAT SERPL-MCNC: 0.92 MG/DL (ref 0.5–1.3)
EGFRCR SERPLBLD CKD-EPI 2021: >90 ML/MIN/1.73M*2
GLUCOSE SERPL-MCNC: 119 MG/DL (ref 74–99)
POTASSIUM SERPL-SCNC: 3.8 MMOL/L (ref 3.5–5.3)
PROT SERPL-MCNC: 7.4 G/DL (ref 6.4–8.2)
SODIUM SERPL-SCNC: 142 MMOL/L (ref 136–145)

## 2024-03-20 PROCEDURE — 3008F BODY MASS INDEX DOCD: CPT | Performed by: NURSE PRACTITIONER

## 2024-03-20 PROCEDURE — 36415 COLL VENOUS BLD VENIPUNCTURE: CPT

## 2024-03-20 PROCEDURE — 99213 OFFICE O/P EST LOW 20 MIN: CPT | Performed by: NURSE PRACTITIONER

## 2024-03-20 PROCEDURE — 1036F TOBACCO NON-USER: CPT | Performed by: NURSE PRACTITIONER

## 2024-03-20 PROCEDURE — 3079F DIAST BP 80-89 MM HG: CPT | Performed by: NURSE PRACTITIONER

## 2024-03-20 PROCEDURE — 80053 COMPREHEN METABOLIC PANEL: CPT

## 2024-03-20 PROCEDURE — 3074F SYST BP LT 130 MM HG: CPT | Performed by: NURSE PRACTITIONER

## 2024-03-20 RX ORDER — DESVENLAFAXINE SUCCINATE 50 MG/1
50 TABLET, EXTENDED RELEASE ORAL DAILY
COMMUNITY
Start: 2024-03-14

## 2024-03-20 ASSESSMENT — PATIENT HEALTH QUESTIONNAIRE - PHQ9
1. LITTLE INTEREST OR PLEASURE IN DOING THINGS: NOT AT ALL
SUM OF ALL RESPONSES TO PHQ9 QUESTIONS 1 AND 2: 0
2. FEELING DOWN, DEPRESSED OR HOPELESS: NOT AT ALL

## 2024-03-20 ASSESSMENT — ENCOUNTER SYMPTOMS
CHEST TIGHTNESS: 0
FATIGUE: 0
CHILLS: 0
NUMBNESS: 0
SHORTNESS OF BREATH: 0
DIZZINESS: 0
PALPITATIONS: 0
NAUSEA: 0
FEVER: 0
WEAKNESS: 0
COUGH: 0
VOMITING: 0
ABDOMINAL PAIN: 0
HEADACHES: 0
DIARRHEA: 0
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
## 2024-03-20 NOTE — PATIENT INSTRUCTIONS
Obtain the blood work as ordered today.  Continue medications at the current dose.  Follow up in 6 months or sooner if needed.

## 2024-03-21 PROBLEM — E87.1 HYPONATREMIA: Status: ACTIVE | Noted: 2024-03-21

## 2024-03-22 ENCOUNTER — TELEPHONE (OUTPATIENT)
Dept: PRIMARY CARE | Facility: CLINIC | Age: 49
End: 2024-03-22
Payer: COMMERCIAL

## 2024-03-22 NOTE — TELEPHONE ENCOUNTER
Talked with pt about his results and he verbalized understanding. He had no further questions at this time.

## 2024-03-22 NOTE — TELEPHONE ENCOUNTER
----- Message from ALLYSON Madrigal-CNP sent at 3/21/2024 11:19 AM EDT -----  Please let him know his labs look better.  Sodium and potassium levels are normal.  His liver enzymes remain elevated but are at baseline.

## 2024-05-16 ENCOUNTER — APPOINTMENT (OUTPATIENT)
Dept: GASTROENTEROLOGY | Facility: CLINIC | Age: 49
End: 2024-05-16
Payer: COMMERCIAL

## 2024-09-25 ENCOUNTER — APPOINTMENT (OUTPATIENT)
Dept: PRIMARY CARE | Facility: CLINIC | Age: 49
End: 2024-09-25
Payer: COMMERCIAL

## 2024-09-25 DIAGNOSIS — I10 PRIMARY HYPERTENSION: ICD-10-CM

## 2024-09-25 RX ORDER — AMLODIPINE BESYLATE 10 MG/1
10 TABLET ORAL DAILY
Qty: 90 TABLET | Refills: 0 | Status: SHIPPED | OUTPATIENT
Start: 2024-09-25

## 2024-09-25 NOTE — TELEPHONE ENCOUNTER
Pt is calling in for med refill on    Amlodipine 10mg    LOV: 03/20/24  NOV: 11/6/24      SOREN Bruno

## 2024-10-31 ENCOUNTER — OFFICE VISIT (OUTPATIENT)
Dept: PRIMARY CARE | Facility: CLINIC | Age: 49
End: 2024-10-31
Payer: COMMERCIAL

## 2024-10-31 VITALS
BODY MASS INDEX: 36.18 KG/M2 | OXYGEN SATURATION: 95 % | DIASTOLIC BLOOD PRESSURE: 89 MMHG | SYSTOLIC BLOOD PRESSURE: 142 MMHG | WEIGHT: 297.2 LBS | TEMPERATURE: 95.8 F | HEART RATE: 99 BPM

## 2024-10-31 DIAGNOSIS — H93.8X3 PRESSURE SENSATION IN BOTH EARS: Primary | ICD-10-CM

## 2024-10-31 PROCEDURE — 1036F TOBACCO NON-USER: CPT | Performed by: STUDENT IN AN ORGANIZED HEALTH CARE EDUCATION/TRAINING PROGRAM

## 2024-10-31 PROCEDURE — 99213 OFFICE O/P EST LOW 20 MIN: CPT | Performed by: STUDENT IN AN ORGANIZED HEALTH CARE EDUCATION/TRAINING PROGRAM

## 2024-10-31 PROCEDURE — 3077F SYST BP >= 140 MM HG: CPT | Performed by: STUDENT IN AN ORGANIZED HEALTH CARE EDUCATION/TRAINING PROGRAM

## 2024-10-31 PROCEDURE — 3079F DIAST BP 80-89 MM HG: CPT | Performed by: STUDENT IN AN ORGANIZED HEALTH CARE EDUCATION/TRAINING PROGRAM

## 2024-10-31 RX ORDER — FLUTICASONE PROPIONATE 50 MCG
1 SPRAY, SUSPENSION (ML) NASAL 2 TIMES DAILY
Qty: 16 G | Refills: 0 | Status: SHIPPED | OUTPATIENT
Start: 2024-10-31 | End: 2024-11-14

## 2024-10-31 RX ORDER — PSEUDOEPHEDRINE HYDROCHLORIDE 60 MG/1
60 TABLET ORAL EVERY 6 HOURS PRN
Qty: 30 TABLET | Refills: 0 | Status: SHIPPED | OUTPATIENT
Start: 2024-10-31 | End: 2024-11-10

## 2024-10-31 ASSESSMENT — ENCOUNTER SYMPTOMS
SHORTNESS OF BREATH: 0
WHEEZING: 0
HEADACHES: 1
SINUS PAIN: 0
CHEST TIGHTNESS: 0
COUGH: 0
VOMITING: 1
COUGH: 1
DIAPHORESIS: 1
SORE THROAT: 1
RHINORRHEA: 1
SINUS PRESSURE: 0
DIARRHEA: 0
ABDOMINAL PAIN: 0
NECK PAIN: 1
CHILLS: 0
FEVER: 0
FATIGUE: 1

## 2024-11-01 ENCOUNTER — APPOINTMENT (OUTPATIENT)
Dept: OPHTHALMOLOGY | Facility: CLINIC | Age: 49
End: 2024-11-01
Payer: COMMERCIAL

## 2024-11-01 DIAGNOSIS — H52.223 REGULAR ASTIGMATISM OF BOTH EYES: Primary | ICD-10-CM

## 2024-11-01 DIAGNOSIS — H52.4 PRESBYOPIA: ICD-10-CM

## 2024-11-01 PROCEDURE — 92014 COMPRE OPH EXAM EST PT 1/>: CPT | Performed by: OPTOMETRIST

## 2024-11-01 PROCEDURE — 92015 DETERMINE REFRACTIVE STATE: CPT | Performed by: OPTOMETRIST

## 2024-11-01 PROCEDURE — 92310 CONTACT LENS FITTING OU: CPT | Performed by: OPTOMETRIST

## 2024-11-01 ASSESSMENT — EXTERNAL EXAM - LEFT EYE: OS_EXAM: NORMAL

## 2024-11-01 ASSESSMENT — ENCOUNTER SYMPTOMS
PSYCHIATRIC NEGATIVE: 0
ENDOCRINE NEGATIVE: 0
HEMATOLOGIC/LYMPHATIC NEGATIVE: 0
GASTROINTESTINAL NEGATIVE: 0
CARDIOVASCULAR NEGATIVE: 0
RESPIRATORY NEGATIVE: 0
CONSTITUTIONAL NEGATIVE: 0
NEUROLOGICAL NEGATIVE: 0
MUSCULOSKELETAL NEGATIVE: 0
ALLERGIC/IMMUNOLOGIC NEGATIVE: 0
EYES NEGATIVE: 1

## 2024-11-01 ASSESSMENT — EXTERNAL EXAM - RIGHT EYE: OD_EXAM: NORMAL

## 2024-11-01 ASSESSMENT — REFRACTION_CURRENTRX
OD_BASECURVE: 8.4
OD_BRAND: ULTRA MF FOR ASTIGMATISM
OS_DIAMETER: 14.2
OD_AXIS: 090
OS_CYLINDER: SPHERE
OS_SPHERE: +0.75
OD_SPHERE: +3.50
OD_BASECURVE: 8.6
OS_ADD: HIGH
OD_ADD: HIGH
OD_AXIS: 090
OS_CYLINDER: SPHERE
OS_BRAND: ULTRA FOR PRESBYOPIA
OD_CYLINDER: -1.25
OD_DIAMETER: 14.5
OS_BRAND: BIOTRUE 1 DAY
OS_BRAND: ULTRA FOR PRESBYOPIA
OS_BASECURVE: 8.5
OS_SPHERE: +0.75
OD_BRAND: ULTRA MF FOR ASTIGMATISM
OD_AXIS: 090
OD_SPHERE: +3.50
OS_BASECURVE: 8.6
OD_ADD: LOW
OS_DIAMETER: 14.2
OS_BASECURVE: 8.5
OD_DIAMETER: 14.5
OS_DIAMETER: 14.2
OS_CYLINDER: SPHERE
OD_SPHERE: +3.50
OS_SPHERE: +2.00
OD_BASECURVE: 8.6
OS_ADD: LOW
OD_DIAMETER: 14.5
OD_CYLINDER: -1.25
OD_BRAND: BIOTRUE 1 DAY FOR ASTIGMATISM
OD_CYLINDER: -1.25

## 2024-11-01 ASSESSMENT — TONOMETRY
OS_IOP_MMHG: 17
OD_IOP_MMHG: 17
IOP_METHOD: GOLDMANN APPLANATION

## 2024-11-01 ASSESSMENT — REFRACTION_MANIFEST
OS_CYLINDER: -0.50
OD_AXIS: 096
OS_ADD: +2.00
OD_CYLINDER: -1.50
OS_SPHERE: +0.75
OD_SPHERE: +3.25
OD_ADD: +2.00
OS_AXIS: 040

## 2024-11-01 ASSESSMENT — VISUAL ACUITY
VA_OR_OS_CURRENT_RX: 20/20
OS_CC: 20/40
VA_OR_OD_CURRENT_RX: 20/20
VA_OR_OS_CURRENT_RX: 20/20
OD_CC+: -1
VA_OR_OS_CURRENT_RX: 20/20
VA_OR_OD_CURRENT_RX: 20/20
VA_OR_OD_CURRENT_RX: 20/20
CORRECTION_TYPE: CONTACTS
OS_PH_CC: 20/20
OD_CC: 20/20
METHOD: SNELLEN - LINEAR

## 2024-11-01 ASSESSMENT — CONF VISUAL FIELD
OS_INFERIOR_NASAL_RESTRICTION: 0
OD_INFERIOR_NASAL_RESTRICTION: 0
OD_INFERIOR_TEMPORAL_RESTRICTION: 0
OS_NORMAL: 1
OS_INFERIOR_TEMPORAL_RESTRICTION: 0
OD_SUPERIOR_NASAL_RESTRICTION: 0
OS_SUPERIOR_NASAL_RESTRICTION: 0
OD_NORMAL: 1
OS_SUPERIOR_TEMPORAL_RESTRICTION: 0
OD_SUPERIOR_TEMPORAL_RESTRICTION: 0

## 2024-11-01 ASSESSMENT — REFRACTION_WEARINGRX
OD_ADD: +2.00
OD_CYLINDER: -1.50
OD_AXIS: 096
OS_AXIS: 045
OS_CYLINDER: -0.50
OD_SPHERE: +3.00
OS_SPHERE: +1.00
OS_ADD: +2.00

## 2024-11-01 ASSESSMENT — SLIT LAMP EXAM - LIDS: COMMENTS: NORMAL

## 2024-11-06 ENCOUNTER — APPOINTMENT (OUTPATIENT)
Dept: PRIMARY CARE | Facility: CLINIC | Age: 49
End: 2024-11-06
Payer: COMMERCIAL

## 2024-11-06 VITALS
SYSTOLIC BLOOD PRESSURE: 138 MMHG | HEART RATE: 99 BPM | WEIGHT: 298.2 LBS | BODY MASS INDEX: 36.3 KG/M2 | OXYGEN SATURATION: 98 % | TEMPERATURE: 98 F | DIASTOLIC BLOOD PRESSURE: 96 MMHG

## 2024-11-06 DIAGNOSIS — K70.9 ALCOHOLIC LIVER DISEASE: ICD-10-CM

## 2024-11-06 DIAGNOSIS — E78.1 HYPERTRIGLYCERIDEMIA: ICD-10-CM

## 2024-11-06 DIAGNOSIS — F41.9 ANXIETY: ICD-10-CM

## 2024-11-06 DIAGNOSIS — R73.01 IMPAIRED FASTING BLOOD SUGAR: ICD-10-CM

## 2024-11-06 DIAGNOSIS — K76.0 FATTY LIVER: ICD-10-CM

## 2024-11-06 DIAGNOSIS — F32.A DEPRESSION, UNSPECIFIED DEPRESSION TYPE: ICD-10-CM

## 2024-11-06 DIAGNOSIS — D69.6 THROMBOCYTOPENIA (CMS-HCC): ICD-10-CM

## 2024-11-06 DIAGNOSIS — M72.2 PLANTAR FASCIITIS: ICD-10-CM

## 2024-11-06 DIAGNOSIS — Z23 FLU VACCINE NEED: ICD-10-CM

## 2024-11-06 DIAGNOSIS — I10 PRIMARY HYPERTENSION: Primary | ICD-10-CM

## 2024-11-06 PROCEDURE — 3075F SYST BP GE 130 - 139MM HG: CPT | Performed by: NURSE PRACTITIONER

## 2024-11-06 PROCEDURE — 90656 IIV3 VACC NO PRSV 0.5 ML IM: CPT | Performed by: NURSE PRACTITIONER

## 2024-11-06 PROCEDURE — 90739 HEPB VACC 2/4 DOSE ADULT IM: CPT | Performed by: NURSE PRACTITIONER

## 2024-11-06 PROCEDURE — 90632 HEPA VACCINE ADULT IM: CPT | Performed by: NURSE PRACTITIONER

## 2024-11-06 PROCEDURE — 99214 OFFICE O/P EST MOD 30 MIN: CPT | Performed by: NURSE PRACTITIONER

## 2024-11-06 PROCEDURE — 90471 IMMUNIZATION ADMIN: CPT | Performed by: NURSE PRACTITIONER

## 2024-11-06 PROCEDURE — 3080F DIAST BP >= 90 MM HG: CPT | Performed by: NURSE PRACTITIONER

## 2024-11-06 PROCEDURE — 90472 IMMUNIZATION ADMIN EACH ADD: CPT | Performed by: NURSE PRACTITIONER

## 2024-11-06 RX ORDER — AMLODIPINE BESYLATE 10 MG/1
10 TABLET ORAL DAILY
Qty: 90 TABLET | Refills: 1 | Status: SHIPPED | OUTPATIENT
Start: 2024-11-06

## 2024-11-06 ASSESSMENT — PATIENT HEALTH QUESTIONNAIRE - PHQ9
1. LITTLE INTEREST OR PLEASURE IN DOING THINGS: NOT AT ALL
2. FEELING DOWN, DEPRESSED OR HOPELESS: NOT AT ALL
SUM OF ALL RESPONSES TO PHQ9 QUESTIONS 1 AND 2: 0

## 2024-11-06 ASSESSMENT — ENCOUNTER SYMPTOMS
PALPITATIONS: 0
DIZZINESS: 0
COUGH: 0
CHEST TIGHTNESS: 0
WEAKNESS: 0
NAUSEA: 0
CHILLS: 0
HEADACHES: 1
SHORTNESS OF BREATH: 0
ABDOMINAL PAIN: 0
DIARRHEA: 0
NUMBNESS: 0
FATIGUE: 1
VOMITING: 0
FEVER: 0

## 2024-11-06 NOTE — PATIENT INSTRUCTIONS
Focus on a low sodium/low fat diet (whole grains, fresh fruits and vegetables, lean meats). Avoid foods high in sugar.  Increase exercise as tolerated.  Referral to liver specialist and podiatry.  Let me know if you want a referral to addiction medicine.  Continue medications at the current dose.  Obtain the fasting blood work as ordered today.  Follow up in 6 months or sooner if needed.

## 2024-11-06 NOTE — PROGRESS NOTES
"Subjective   Chano Smith is a 49 y.o. male who presents for Blood Pressure Check and Flu Vaccine (Pt would like today and all screening questions were answered. /).    HPI  He presents to the office today for blood pressure follow up and medication refills. Blood pressure is slightly elevated but currently taking a decongestant for the past few days.  He is tolerating medications well at current dose- no side effects. No chest pain, shortness of breath, palpitations or edema. No headaches, numbness, tingling, weakness or vision changes.  No dizziness.  Last eye exam: updated eye exam  Diet: Lunch is healthy  Dinner and evening snacking is not always the best  Exercise: No scheduled exercise.   Weight: stable  Colonoscopy due in 2/2033    He reports he is having issues with plantar fasciitis.  He has had issues with it in the past. Has orthotics but is due for a new pair.  Would like a referral to podiatry.    Last labs: ordered today    Follows with [facility] in [place] for psychiatry and therapy.  Late May to June- had a breakdown and was put on leave at work for 6 weeks.  Work has been better since then.  Quit drinking for 2 months and then went to a conference and started drinking again. Now is back to drinking \"too much\" nightly     Review of Systems   Constitutional:  Positive for fatigue. Negative for chills and fever.   Eyes:  Negative for visual disturbance.   Respiratory:  Negative for cough, chest tightness and shortness of breath.    Cardiovascular:  Negative for chest pain, palpitations and leg swelling.   Gastrointestinal:  Negative for abdominal pain, diarrhea, nausea and vomiting.   Neurological:  Positive for headaches. Negative for dizziness, weakness and numbness.     Objective   BP (!) 138/96 (BP Location: Left arm, Patient Position: Sitting)   Pulse 99   Temp 36.7 °C (98 °F) (Temporal)   Wt 135 kg (298 lb 3.2 oz)   SpO2 98%   BMI 36.30 kg/m²     Physical " Exam  Constitutional:       General: He is not in acute distress.     Appearance: Normal appearance. He is not toxic-appearing.   Eyes:      Extraocular Movements: Extraocular movements intact.      Conjunctiva/sclera: Conjunctivae normal.      Pupils: Pupils are equal, round, and reactive to light.   Neck:      Vascular: No carotid bruit.   Cardiovascular:      Rate and Rhythm: Normal rate and regular rhythm.      Pulses: Normal pulses.      Heart sounds: Normal heart sounds, S1 normal and S2 normal. No murmur heard.  Pulmonary:      Effort: Pulmonary effort is normal. No respiratory distress.      Breath sounds: Normal breath sounds.   Abdominal:      General: Bowel sounds are normal.      Palpations: Abdomen is soft.      Tenderness: There is no abdominal tenderness.   Musculoskeletal:      Right lower leg: No edema.      Left lower leg: No edema.   Lymphadenopathy:      Cervical: No cervical adenopathy.   Neurological:      Mental Status: He is alert and oriented to person, place, and time.   Psychiatric:         Attention and Perception: Attention normal.         Mood and Affect: Mood and affect normal.         Behavior: Behavior normal. Behavior is cooperative.         Thought Content: Thought content normal.         Cognition and Memory: Cognition normal.         Judgment: Judgment normal.     Assessment/Plan   Problem List Items Addressed This Visit       Hypertriglyceridemia     Check an updated FLP.         Primary hypertension - Primary     Slightly elevated today but has been taking a decongestant. Continue medications at the current dose.         Relevant Medications    amLODIPine (Norvasc) 10 mg tablet    Other Relevant Orders    CBC    Comprehensive Metabolic Panel    Lipid Panel    TSH with reflex to Free T4 if abnormal    Anxiety     Follows with psychiatry and psychology.         Depression     Follows with psychiatry and psychology.         Fatty liver     Referral to hepatology. He was given Hep  A and Hep B vaccines today.         Relevant Orders    Referral to Hepatology    Impaired fasting blood sugar     Check an updated hemoglobin A1C. Focus on a low carb and low sugar diet.         Relevant Orders    Hemoglobin A1C    Thrombocytopenia (CMS-HCC)     Recheck CBC.         Plantar fasciitis    Relevant Orders    Referral to Podiatry    Alcoholic liver disease     Other Visit Diagnoses       Flu vaccine need        Relevant Orders    Flu vaccine, trivalent, preservative free, age 6 months and greater (Fluarix/Fluzone/Flulaval) (Completed)    Hepatitis A vaccine, age 19 years and greater (HAVRIX) (Completed)    Hepatitis B vaccine, 18yrs and older (HEPLISAV) (Completed)            It has been a pleasure seeing you today!

## 2024-11-08 NOTE — ASSESSMENT & PLAN NOTE
Slightly elevated today but has been taking a decongestant. Continue medications at the current dose.

## 2024-12-04 ENCOUNTER — APPOINTMENT (OUTPATIENT)
Dept: PRIMARY CARE | Facility: CLINIC | Age: 49
End: 2024-12-04
Payer: COMMERCIAL

## 2024-12-19 ENCOUNTER — APPOINTMENT (OUTPATIENT)
Dept: OPHTHALMOLOGY | Age: 49
End: 2024-12-19
Payer: COMMERCIAL

## 2024-12-20 ENCOUNTER — APPOINTMENT (OUTPATIENT)
Dept: OPHTHALMOLOGY | Age: 49
End: 2024-12-20
Payer: COMMERCIAL

## 2024-12-20 DIAGNOSIS — H52.223 REGULAR ASTIGMATISM OF BOTH EYES: Primary | ICD-10-CM

## 2024-12-20 DIAGNOSIS — H52.03 HYPEROPIA OF BOTH EYES: ICD-10-CM

## 2024-12-20 ASSESSMENT — ENCOUNTER SYMPTOMS
EYES NEGATIVE: 1
RESPIRATORY NEGATIVE: 0
NEUROLOGICAL NEGATIVE: 0
GASTROINTESTINAL NEGATIVE: 0
ALLERGIC/IMMUNOLOGIC NEGATIVE: 0
HEMATOLOGIC/LYMPHATIC NEGATIVE: 0
PSYCHIATRIC NEGATIVE: 0
MUSCULOSKELETAL NEGATIVE: 0
CARDIOVASCULAR NEGATIVE: 0
CONSTITUTIONAL NEGATIVE: 0
ENDOCRINE NEGATIVE: 0

## 2024-12-20 ASSESSMENT — REFRACTION_CURRENTRX
OD_BRAND: ULTRA MF FOR ASTIGMATISM
OS_DIAMETER: 14.2
OS_DIAMETER: 14.2
OS_CYLINDER: SPHERE
OD_ADD: HIGH
OD_BASECURVE: 8.6
OD_DIAMETER: 14.5
OD_AXIS: 090
OD_AXIS: 090
OD_BASECURVE: 8.6
OD_BRAND: ULTRA MF FOR ASTIGMATISM
OD_CYLINDER: -1.25
OS_BRAND: ULTRA FOR PRESBYOPIA
OS_BASECURVE: 8.5
OS_SPHERE: +0.75
OD_SPHERE: +2.00
OS_SPHERE: +0.75
OD_ADD: HIGH
OS_ADD: HIGH
OS_ADD: HIGH
OS_CYLINDER: SPHERE
OD_DIAMETER: 14.5
OD_CYLINDER: -1.25
OS_BASECURVE: 8.5
OD_SPHERE: +3.50
OS_BRAND: ULTRA FOR PRESBYOPIA

## 2024-12-20 ASSESSMENT — VISUAL ACUITY
VA_OR_OS_CURRENT_RX: 20/20
VA_OR_OD_CURRENT_RX: 20/20
VA_OR_OD_CURRENT_RX: 20/20
OD_CC: 20/60
OS_CC: 20/20
VA_OR_OS_CURRENT_RX: 20/20
METHOD: SNELLEN - LINEAR
CORRECTION_TYPE: CONTACTS
OD_PH_CC: 20/25

## 2024-12-20 ASSESSMENT — REFRACTION_WEARINGRX
OS_ADD: +2.00
OD_SPHERE: +3.25
SPECS_TYPE: LAST MR
OS_SPHERE: +0.75
OD_ADD: +2.00
OD_CYLINDER: -1.50
OS_CYLINDER: -0.50
OD_AXIS: 096
OS_AXIS: 040

## 2024-12-20 ASSESSMENT — SLIT LAMP EXAM - LIDS: COMMENTS: NORMAL

## 2024-12-20 ASSESSMENT — EXTERNAL EXAM - LEFT EYE: OS_EXAM: NORMAL

## 2024-12-20 ASSESSMENT — EXTERNAL EXAM - RIGHT EYE: OD_EXAM: NORMAL

## 2024-12-20 NOTE — PROGRESS NOTES
Monovision contacts OS is at +1.50 add equivalent for near. Patient is now having a tough time with computer distance and reading. Appears that OS is now being used more for intermediate as OD is unable to focus on computer distance and this is the cause of complaint of not being able to find the correct distance to focus on computer screens.     Consider MF lenses. I ordered Ultra MF toric OD and MF sphere OS. Patient at [facility] seeing 20/20 at distance and near OU. With -1.50 over OD the distance improves the near gets worse. Recommend to keep the Rx with OD slightly overcorrected (more plus) for better near and trade off is that DVA is slightly worse.   RPE spot peripheral retina OD and The patient was asked to return to our clinic or seek out eye care ASAP if new flashes of light or floaters are noted.      The patient was asked to return to our clinic in one year or sooner if ocular or vision changes occur. BERKLEY holliday as well then.

## 2025-01-13 ENCOUNTER — TELEPHONE (OUTPATIENT)
Dept: OPHTHALMOLOGY | Facility: CLINIC | Age: 50
End: 2025-01-13

## 2025-01-29 ENCOUNTER — APPOINTMENT (OUTPATIENT)
Dept: PODIATRY | Facility: CLINIC | Age: 50
End: 2025-01-29
Payer: COMMERCIAL

## 2025-01-29 DIAGNOSIS — M72.2 PLANTAR FASCIITIS: ICD-10-CM

## 2025-01-29 DIAGNOSIS — M62.461 GASTROCNEMIUS EQUINUS, RIGHT: ICD-10-CM

## 2025-01-29 DIAGNOSIS — M79.671 HEEL PAIN, BILATERAL: ICD-10-CM

## 2025-01-29 DIAGNOSIS — M79.672 HEEL PAIN, BILATERAL: ICD-10-CM

## 2025-01-29 DIAGNOSIS — M62.462 GASTROCNEMIUS EQUINUS, LEFT: Primary | ICD-10-CM

## 2025-01-29 PROCEDURE — 1036F TOBACCO NON-USER: CPT | Performed by: PODIATRIST

## 2025-01-29 PROCEDURE — 99203 OFFICE O/P NEW LOW 30 MIN: CPT | Performed by: PODIATRIST

## 2025-01-29 NOTE — PROGRESS NOTES
History of Present Illness:   Patient states they are here for foot pain  Most bothersome when walking  Denies trauma to area  Hurts throughout day  Denies being DM  No improvement noted  Looking for relief  NO other pedal complaints at this time    Had inserts, approx 6 years ago. Looking for new pair      Past Medical History  Past Medical History:   Diagnosis Date    Allergic Seasonal    Allergic rhinitis due to dust mite 05/08/2023    Allergic rhinitis due to pollen 05/08/2023    Anxiety     Depression Years ago    Hypertension Years ago    Hypomagnesemia 05/08/2023    Peroneal tendonitis of left lower extremity 05/08/2023    Substance abuse (Multi) Years ago       Medications and Allergies have been reviewed.    Review Of Systems:  GENERAL: No weight loss, malaise or fevers.  HEENT: Negative for frequent or significant headaches,   RESPIRATORY: Negative for cough, wheezing or shortness of breath.  CARDIOVASCULAR: Negative for chest pain, leg swelling or palpitations.      Examination of Both Lower Extremities:   Objective:   Vasc: DP and PT pulses are palpable bilateral.  CFT is less than 3 seconds bilateral.  Skin temperature is warm to cool proximal to distal bilateral.      Neuro: Vibratory, light touch and proprioception are intact bilateral.  Protective sensation is intact to the foot when tested with the 5.07 SWM bilateral.  Achilles reflex is 2/4 bilateral.  There is no clonus noted.  The hallux is downgoing bilateral.      Derm: Nails 1-5 bilateral are intact.  Skin is supple with normal texture and turgor noted.  Webspaces are clean, dry and intact bilateral.  There are no hyperkeratoses, ulcerations, verruca or other lesions noted.      Ortho: Muscle strength is 5/5 for all pedal groups tested.  Tight calf muscle noted. No edema, erythema or ecchymosis noted      1. Gastrocnemius equinus, left  Custom Orthotics    Referral to Physical Therapy      2. Plantar fasciitis  Referral to Podiatry    Custom  Orthotics      3. Gastrocnemius equinus, right  Custom Orthotics    Referral to Physical Therapy      4. Heel pain, bilateral  Custom Orthotics    Referral to Physical Therapy        Patient exam and eval  Recommend stretching and PT.   Gave stretch recommendation  Recommend powerstep inserts  Gave rx for custom inserts and list of vendors  Discussed arthritic changes in feet  Discussed causes, symptoms, aggravating factors and treatment options  Recommend stiff supportive shoe gear  Shoes that do not twist or bend  Minimize walking barefoot  Discussed 80/20 rule  Discussed ice, nsaids, bronwyn valero/biofreeze  Patient to follow up if no improvement noted.   Pt in agreement to plan  All questions answered          Caridad Mccarty DPM  [phone]  Option 2  Fax: [phone]   right  wrist

## 2025-02-11 ENCOUNTER — APPOINTMENT (OUTPATIENT)
Dept: GASTROENTEROLOGY | Facility: CLINIC | Age: 50
End: 2025-02-11
Payer: COMMERCIAL

## 2025-04-04 ENCOUNTER — APPOINTMENT (OUTPATIENT)
Dept: PRIMARY CARE | Facility: CLINIC | Age: 50
End: 2025-04-04
Payer: COMMERCIAL

## 2025-04-08 ENCOUNTER — APPOINTMENT (OUTPATIENT)
Dept: PRIMARY CARE | Facility: CLINIC | Age: 50
End: 2025-04-08
Payer: COMMERCIAL

## 2025-04-08 VITALS
HEIGHT: 76 IN | HEART RATE: 90 BPM | TEMPERATURE: 98 F | OXYGEN SATURATION: 95 % | SYSTOLIC BLOOD PRESSURE: 136 MMHG | BODY MASS INDEX: 36.63 KG/M2 | DIASTOLIC BLOOD PRESSURE: 72 MMHG | WEIGHT: 300.8 LBS

## 2025-04-08 DIAGNOSIS — J06.9 VIRAL UPPER RESPIRATORY TRACT INFECTION: Primary | ICD-10-CM

## 2025-04-08 DIAGNOSIS — H69.93 DYSFUNCTION OF BOTH EUSTACHIAN TUBES: ICD-10-CM

## 2025-04-08 PROCEDURE — 3078F DIAST BP <80 MM HG: CPT | Performed by: NURSE PRACTITIONER

## 2025-04-08 PROCEDURE — 3075F SYST BP GE 130 - 139MM HG: CPT | Performed by: NURSE PRACTITIONER

## 2025-04-08 PROCEDURE — 3008F BODY MASS INDEX DOCD: CPT | Performed by: NURSE PRACTITIONER

## 2025-04-08 PROCEDURE — 99213 OFFICE O/P EST LOW 20 MIN: CPT | Performed by: NURSE PRACTITIONER

## 2025-04-08 PROCEDURE — 1036F TOBACCO NON-USER: CPT | Performed by: NURSE PRACTITIONER

## 2025-04-08 RX ORDER — CLONAZEPAM 0.5 MG/1
0.5 TABLET ORAL NIGHTLY
COMMUNITY
Start: 2025-03-21

## 2025-04-08 ASSESSMENT — ENCOUNTER SYMPTOMS
WHEEZING: 0
SHORTNESS OF BREATH: 0
FATIGUE: 1
VOMITING: 0
CHILLS: 1
EYE ITCHING: 1
SINUS PRESSURE: 1
MYALGIAS: 0
NAUSEA: 1
SINUS PAIN: 0
FEVER: 0
RHINORRHEA: 1
HEADACHES: 1
ABDOMINAL PAIN: 0
SORE THROAT: 1
COUGH: 0
DIARRHEA: 0

## 2025-04-08 ASSESSMENT — PATIENT HEALTH QUESTIONNAIRE - PHQ9
2. FEELING DOWN, DEPRESSED OR HOPELESS: NOT AT ALL
1. LITTLE INTEREST OR PLEASURE IN DOING THINGS: NOT AT ALL
SUM OF ALL RESPONSES TO PHQ9 QUESTIONS 1 AND 2: 0

## 2025-04-08 NOTE — PATIENT INSTRUCTIONS
Use Fluticasone nasal spray- 2 sprays each nostril once a day after a shower.   Let me know if no improvement by the end of the week or sooner if worse.

## 2025-04-08 NOTE — PROGRESS NOTES
"Subjective   Chano Smith is a 50 y.o. male who presents for URI (Started last Tuesday. Current SX: Facial pressure, congestion, neck pain and post nasal drip. Tried Allegra and Benadryl kind of helped.  Pt reported he use to get allergy shots. ).    URI   Associated symptoms include congestion, headaches, nausea, rhinorrhea, sneezing and a sore throat. Pertinent negatives include no abdominal pain, coughing, diarrhea, ear pain, sinus pain, vomiting or wheezing.     He presents to the office today with one week of symptoms.  This started when the weather changed. Was also cleaning during that time  (+) chills initially   no body aches  (+) fatigue  (+) headache  Threw up mucus once  Started with PND and nasal drainage. (+) sore throat  (+) sneezing fits.  (+) itchy eyes but not watery.  (+) ear fullness (R>L)  (+) mild ear pain  Reports fluctuates between runny to stuffy.  Has to sleep sitting up he is so stuffy.    He reports about 10 days ago tried to die his beard and thought that may have contributed but doesn't have any coughing, SOB or wheezing.    Review of Systems   Constitutional:  Positive for chills and fatigue. Negative for fever.   HENT:  Positive for congestion, postnasal drip, rhinorrhea, sinus pressure, sneezing and sore throat. Negative for ear pain and sinus pain.    Eyes:  Positive for itching.   Respiratory:  Negative for cough, shortness of breath and wheezing.    Gastrointestinal:  Positive for nausea. Negative for abdominal pain, diarrhea and vomiting.   Musculoskeletal:  Negative for myalgias.   Neurological:  Positive for headaches.       Objective   /72 (BP Location: Left arm, Patient Position: Sitting)   Pulse 90   Temp 36.7 °C (98 °F) (Temporal)   Ht 1.92 m (6' 3.59\")   Wt 136 kg (300 lb 12.8 oz)   SpO2 95%   BMI 37.01 kg/m²     Physical Exam  Constitutional:       General: He is not in acute distress.     Appearance: Normal appearance. He is not toxic-appearing.   HENT:      " Right Ear: Hearing, tympanic membrane, ear canal and external ear normal.      Left Ear: Hearing, tympanic membrane, ear canal and external ear normal.      Nose:      Right Sinus: Maxillary sinus tenderness and frontal sinus tenderness present.      Left Sinus: Maxillary sinus tenderness and frontal sinus tenderness present.      Mouth/Throat:      Mouth: Mucous membranes are moist.      Pharynx: No pharyngeal swelling or posterior oropharyngeal erythema.   Cardiovascular:      Rate and Rhythm: Normal rate and regular rhythm.      Heart sounds: Normal heart sounds, S1 normal and S2 normal. No murmur heard.  Pulmonary:      Effort: Pulmonary effort is normal.      Breath sounds: Normal breath sounds and air entry.   Lymphadenopathy:      Cervical: No cervical adenopathy.   Neurological:      Mental Status: He is alert and oriented to person, place, and time.   Psychiatric:         Mood and Affect: Mood normal.         Behavior: Behavior normal.         Thought Content: Thought content normal.         Judgment: Judgment normal.         Assessment/Plan   Problem List Items Addressed This Visit    None  Visit Diagnoses       Viral upper respiratory tract infection    -  Primary    Dysfunction of both eustachian tubes            Fluticasone nasal spray- 2 sprays each nostril once a day after a shower.   He is to let me know if no improvement by the end of the week or sooner if worse.    It has been a pleasure seeing you today!

## 2025-04-29 ENCOUNTER — OFFICE VISIT (OUTPATIENT)
Dept: GASTROENTEROLOGY | Facility: CLINIC | Age: 50
End: 2025-04-29
Payer: COMMERCIAL

## 2025-04-29 VITALS
HEART RATE: 86 BPM | SYSTOLIC BLOOD PRESSURE: 136 MMHG | OXYGEN SATURATION: 94 % | HEIGHT: 76 IN | BODY MASS INDEX: 37.35 KG/M2 | DIASTOLIC BLOOD PRESSURE: 75 MMHG | WEIGHT: 306.7 LBS

## 2025-04-29 DIAGNOSIS — R74.8 ELEVATED LIVER ENZYMES: ICD-10-CM

## 2025-04-29 DIAGNOSIS — K76.0 FATTY LIVER: Primary | ICD-10-CM

## 2025-04-29 PROCEDURE — 1036F TOBACCO NON-USER: CPT | Performed by: INTERNAL MEDICINE

## 2025-04-29 PROCEDURE — 99215 OFFICE O/P EST HI 40 MIN: CPT | Performed by: INTERNAL MEDICINE

## 2025-04-29 PROCEDURE — 3078F DIAST BP <80 MM HG: CPT | Performed by: INTERNAL MEDICINE

## 2025-04-29 PROCEDURE — 3008F BODY MASS INDEX DOCD: CPT | Performed by: INTERNAL MEDICINE

## 2025-04-29 PROCEDURE — 3075F SYST BP GE 130 - 139MM HG: CPT | Performed by: INTERNAL MEDICINE

## 2025-04-29 ASSESSMENT — ENCOUNTER SYMPTOMS
DEPRESSION: 0
LOSS OF SENSATION IN FEET: 0
OCCASIONAL FEELINGS OF UNSTEADINESS: 0

## 2025-04-29 ASSESSMENT — COLUMBIA-SUICIDE SEVERITY RATING SCALE - C-SSRS
2. HAVE YOU ACTUALLY HAD ANY THOUGHTS OF KILLING YOURSELF?: NO
6. HAVE YOU EVER DONE ANYTHING, STARTED TO DO ANYTHING, OR PREPARED TO DO ANYTHING TO END YOUR LIFE?: NO
1. IN THE PAST MONTH, HAVE YOU WISHED YOU WERE DEAD OR WISHED YOU COULD GO TO SLEEP AND NOT WAKE UP?: NO

## 2025-04-29 ASSESSMENT — PAIN SCALES - GENERAL: PAINLEVEL_OUTOF10: 0-NO PAIN

## 2025-04-29 NOTE — PATIENT INSTRUCTIONS
Aggressively work on diet, weight loss and exercise.    Get lab test today, in 6 months and in 12 months.    Get a standard ultrasound of the liver now.    Get a FibroScan ultrasound of the liver now.    Complete your hepatitis A and hepatitis B vaccines with your PCP.    Avoid all alcohol use.    See me back in clinic in about 1 year.   Student

## 2025-04-29 NOTE — PROGRESS NOTES
"HEPATOLOGY RETURN PATIENT VISIT    April 29, 2025    Dr. Aleah Donahue       Patient Name:   CHANO MARIA  Medical Record Number: [id number]  YOB: 1975    Dear Dr. Donahue,    I had the pleasure of seeing Chano Maria for follow-up evaluation in the  Liver Clinic ([facility] office). History and physical examination was performed. Pertinent available laboratory, imaging, pathology results were reviewed.     He missed his 2/11/2025 appointment with me.    He has not seen me in clinic since 2/21/2023.    History of Present Illness:   The patient is a 49 year old white male who is referred for follow-up evaluation of fatty liver disease and alcoholic liver disease.    The patient has risk factors for fatty liver disease including alcohol abuse, hyperlipidemia, and obesity.  He has never been on therapy for hyperlipidemia.  He has never been diagnosed with diabetes.  His weight has been relatively stable for the last few years.    The patient denied any past history of acute hepatitis or jaundice.      He has never had any manifestations of liver disease including no jaundice, ascites, hepatic encephalopathy, or variceal bleeding. He denied ever having a liver biopsy.    He initially saw me in clinic on 2/21/2023.  At that time, I did additional evaluation (see results below).  He now returns 2 years later.    After he initially saw me, he presented to [facility] with nausea, vomiting and abdominal pain.  He was found to have gallstones.  He underwent a laparoscopic cholecystectomy 1/31/2024.  Unfortunately, the surgeon made no comment about the gross appearance of the liver and for unclear reasons did not do a liver biopsy.  He believes that the surgeon told him that everything looked fine during the surgery.    Despite all of his issues and seeing me in discussing alcohol abstinence, he has continued to drink heavily.  In the note from his PCP on 11/6/2024 he was drinking \"too " much” on a nightly basis.    He presented today for follow-up evaluation.  He denied any specific liver-related complaints.  He does occasionally get right upper quadrant pain or right flank pain when he stands up after sitting for a long time.  Within a couple minutes it goes away on its own.    Past Medical/Surgical History:   ? Spleen enlarged (789.2) (R16.1)  ? Thrombocytopenia (287.5) (D69.6)  ? Alcohol abuse (305.00) (F10.10)  ? Insomnia (780.52) (G47.00)  ? HTN (hypertension) (401.9) (I10)  ? Hypercalcemia (275.42) (E83.52)  ? Anxiety (300.00) (F41.9)  ? Depression (311) (F32.A)  ? Fatty liver (571.8) (K76.0)  ? Allergic rhinitis (477.9) (J30.9)  ? Allergic rhinitis due to dust mite (477.8) (J30.89)  ? Allergic rhinitis due to pollen (477.0) (J30.1)  ? Anxiety (300.00) (F41.9)  ? Body mass index (BMI) of 35.0 to 35.9 in adult (V85.35) (Z68.35)  ? Class 1 obesity with body mass index (BMI) of 34.0 to 34.9 in adult (278.00,V85.34)  ? Colon cancer screening (V76.51) (Z12.11)  ? Depression (311) (F32.A)  ? Drug-induced hypokalemia (276.8,E947.9) (E87.6,T50.905A)  ? Elevated liver enzymes (790.5) (R74.8)  ? Encounter for immunization (V03.89) (Z23)  ? HTN (hypertension) (401.9) (I10)  ? Hypokalemia (276.8) (E87.6)  ? Hypomagnesemia (275.2) (E83.42)  ? Impaired fasting blood sugar (790.21) (R73.01)  ? Leukopenia (288.50) (D72.819)  ? Rhinitis (472.0) (J31.0)  ? Seasonal allergies (477.9) (J30.2)  ? Thrombocytopenia (287.5) (D69.6)  ? Transient elevated blood pressure (796.2) (R03.0)  ? History of Tonsillectomy  ? History of Vasectomy  Cholecystectomy    Family History:   ? Family history of lung cancer (V16.1) (Z80.1)  ? Family history of amyotrophic lateral sclerosis (V17.2) (Z82.0)  There is no known family history of liver disease.  There is no known family history of colon cancer.  His father had prostate cancer and lung cancer.    Social History:   He has a history of alcohol abuse.  He was drinking up to 3  bottles of rum per week.  He has been trying to significantly decrease his alcohol intake.  He still drinks but did not quantify how much.  He does admit that he probably drinks too much.  He denied tobacco use.  He denied blood transfusions.  He denied tattoos.  He denied intravenous drug use.  He works for the [employer].    Review of systems: As noted above.  He does have dry skin.  He does have anxiety and depression.  He has been getting shortness of breath with exertion.  He has been getting right upper quadrant pain and right flank pain when he stands up.  No fever, chills, weight loss, visual changes, auditory changes, shortness of breath, chest pain, GI bleeding, diarrhea, constipation, dysuria, hematuria, musculoskeletal issues, or rash.       Medical, Surgical, Family, and Social Histories  Medical History[1]    Surgical History[2]    Family History[3]    Social History     Socioeconomic History    Marital status:      Spouse name: Not on file    Number of children: Not on file    Years of education: Not on file    Highest education level: Not on file   Occupational History    Not on file   Tobacco Use    Smoking status: Never     Passive exposure: Never    Smokeless tobacco: Former     Types: Chew     Quit date: 10/6/2006   Vaping Use    Vaping status: Never Used   Substance and Sexual Activity    Alcohol use: Yes    Drug use: Never    Sexual activity: Yes     Partners: Female     Birth control/protection: Male Sterilization   Other Topics Concern    Not on file   Social History Narrative    Not on file     Social Drivers of Health     Financial Resource Strain: Not on file   Food Insecurity: Not on file   Transportation Needs: Not on file   Physical Activity: Not on file   Stress: Not on file   Social Connections: Not on file   Intimate Partner Violence: Not on file   Housing Stability: Not on file       Allergies and Current Medications  Allergies[4]  Current  "Medications[5]     Physical Exam  /75 (BP Location: Left arm, Patient Position: Sitting, BP Cuff Size: Large adult)   Pulse 86   Ht 1.93 m (6' 4\")   Wt 139 kg (306 lb 11.2 oz)   SpO2 94%   BMI 37.33 kg/m²       Physical Examination:   General Appearance: alert and in no acute distress.   HEENT: oropharynx without lesions. Anicteric sclerae.   Neck supple, nontender, without adenopathy, thyromegaly, or JVD.   Lungs clear to auscultation and percussion.   Heart RRR without murmurs, rubs, or gallops.   Abdomen: Soft, nontender, bowel sounds positive, without obvious ascites. Liver and spleen not palpable.  The abdominal examination is somewhat limited in checking for hepatosplenomegaly as he is very ticklish.  He is overweight centrally.  Extremities full ROM, no atrophy, normal strength. No edema.   Skin no specific lesions.   Neurological exam nonfocal, alert and oriented.  No asterixis.  No spider angiomata, or palmar erythema.     Labs 3/20/2024 glucose 119, sodium 142, creatinine 0.72, protein 7.4, albumin 4.4, alk phos 85, bilirubin 0.7, AST 48, ALT 67.    Labs 3/6/2024 lipase 53, WBC 7.2, hemoglobin 16.7, platelets 168.    Labs 11/30/2023 , ALT 72.    Labs 11/17/2023 platelets 111, lipase 257.    Labs 2/21/2023 antimitochondrial antibody negative, hepatitis A antibody negative, hepatitis B surface antigen negative, JUAN negative, ferritin 1060, cholesterol 263, triglyceride 615, AST 67, ALT 56, alpha-1 antitrypsin phenotype MM, iron saturation 41%, INR 1, smooth muscle antibody 40, hepatitis C antibody negative, ceruloplasmin 24, hepatitis B surface antibody negative.    Labs 12/6/2022 WBC 5.4, hemoglobin 15.2, platelets 110, glucose 117, sodium 137, creatinine 1.15, protein 7.2, albumin 4.3, alk phos 94, bilirubin 1.5, AST 91, ALT 75.    Labs 8/29/2022 urine ethyl glucuronide positive @ > 10,000, urine ethyl sulfate positive @ > 10,000, urine drug screen negative    Labs 7/21/2022 glucose 122, " sodium 138, creatinine 0.97.    Labs 7/19/2022 protein 7.9, albumin 4, alk phos 82, bilirubin 2, AST 90, ALT 93, WBC 4.4, hemoglobin 15.1, platelets 97, hepatitis B surface antibody negative.    Labs 4/28/2021 AST 45, ALT 72, hemoglobin A1c 5.2%.    Labs 2/10/2021 AST 88, , cholesterol 236, , triglycerides 151.    CAT scan with contrast 3/6/2024:  IMPRESSION:  No acute pathology.    Ultrasound 11/20/2023:  IMPRESSION:  1. The gallbladder contains stones and sludge. No gallbladder wall  thickening or pericholecystic edema to indicate acute cholecystitis.  2. Hepatomegaly and steatosis.      CAT scan with contrast 7/19/2022 showed hepatosplenomegaly and marked hepatic steatosis, gallbladder sludge, no appendicitis, no acute findings in the abdomen or pelvis.  There was no biliary dilatation.    Ultrasound 8/17/2017 revealed unremarkable ultrasound of the right upper quadrant except for mildly enlarged spleen.    FibroScan 4/5/2023 revealed a median liver stiffness of 4.8 kPa with IQR 8% and .          Assessment/Plan:     Fatty liver disease  Alcoholic liver disease  Stage 0-1 fibrosis with moderate steatosis on 4/2023 FibroScan    The patient is referred to me for follow-up evaluation of fatty liver disease and alcoholic liver disease.    He almost assuredly has a combination of alcoholic fatty liver disease and nonalcoholic fatty liver disease.    I again made it clear to him that he needs to avoid absolutely all alcohol.  I spoke with him about the risk of further damage to his liver disease and even the development of cirrhosis.  He has been referred to an addiction specialist in the past by his PCP.  However, it does not appear that he ever followed through with that.    We again spoke about fatty liver disease. I explained that the risk factors include diabetes, obesity, hyperlipidemia and alcohol use. I explained that he needs to work on diet, weight loss and exercise.  He also needs to  work aggressively with his primary provider about hyperlipidemia.  I did explain that 20-25% of patients with LOPEZ may proceed to cirrhosis.    I again reminded him that working on the same risk factors could decrease his future risk of heart disease and stroke.    He does have significant hyperlipidemia.  I did encourage him to speak with his PCP about possible treatment options for this.  I will recheck lipids and A1c today and will forward these to his PCP.    Luckily, despite all of these issues, his FibroScan in April 2023 showed essentially no fibrosis.    I will recommend repeat FibroScan now to assess for any worsening of fibrosis.    His serologies did show elevated ferritin and borderline iron saturation.  I will check a hemochromatosis genetic test.    I will recheck his labs along with a PETH level today.  These can be repeated in 6 and 12 months as well.    He is not immune to hepatitis A or hepatitis B.  His PCP has been giving him these vaccines.  I encouraged him to complete the entire vaccine series through his PCP.    He does get some right-sided discomfort when he stands up.  It is not clear to me whether this is musculoskeletal or not.  We will do a standard ultrasound.    Thank you for allowing me to participate in the care of this patient. Please feel free to contact me with any questions regarding their care.     Sincerely,     Conrado Forrester MD, VERONICA, FAASLD, FACG.   Medical Director, Hepatology  Senior Attending Physician  [facility]  [facility]  Professor of Medicine  Division of Gastroenterology and Liver Disease  [employer]  [address] [place] [id number]  Phone: [phone]  Fax: [phone].      This document was generated with a computerized dictation system.  Because of this, there could be errors in grammar and/or content.           [1]   Past Medical  History:  Diagnosis Date    Allergic Seasonal    Allergic rhinitis due to dust mite 05/08/2023    Allergic rhinitis due to pollen 05/08/2023    Anxiety     Depression Years ago    Hypertension Years ago    Hypomagnesemia 05/08/2023    Peroneal tendonitis of left lower extremity 05/08/2023    Substance abuse Years ago   [2]   Past Surgical History:  Procedure Laterality Date    CHOLECYSTECTOMY  1/31/2024    CIRCUMCISION, PRIMARY  Years ago    OTHER SURGICAL HISTORY  02/10/2021    Tonsillectomy    OTHER SURGICAL HISTORY  02/10/2021    Vasectomy    TONSILLECTOMY  2015    VASECTOMY  2015   [3]   Family History  Problem Relation Name Age of Onset    No Known Problems Mother      Lung cancer Father Roman Smith     Diverticulitis Father Roman Smith     Cancer Father Roman Smith     ALS Sister      Irritable bowel syndrome Sister      REYNOLD disease Brother      Diverticulosis Father's Sister      Macular degeneration Paternal Grandmother     [4]   Allergies  Allergen Reactions    Acrylic Acid Anaphylaxis    Cortisone Other     Arm felt like it was burning; Sweating all over.   [5]   Current Outpatient Medications   Medication Sig Dispense Refill    amLODIPine (Norvasc) 10 mg tablet Take 1 tablet (10 mg) by mouth once daily. 90 tablet 1    clonazePAM (KlonoPIN) 0.5 mg tablet Take 1 tablet (0.5 mg) by mouth once daily at bedtime.      desvenlafaxine 50 mg 24 hr tablet Take 1 tablet (50 mg) by mouth once daily.      lamoTRIgine (LaMICtal) 25 mg tablet Take 3 tablets (75 mg) by mouth 2 times a day.      loratadine (Claritin) 10 mg tablet Take 1 tablet (10 mg) by mouth.      naltrexone (Depade) 50 mg tablet Take 1 tablet (50 mg) by mouth once daily.      QUEtiapine (SEROquel) 25 mg tablet Take 1 tablet (25 mg) by mouth once daily at bedtime.      fluticasone (Flonase) 50 mcg/actuation nasal spray Administer 1 spray into each nostril 2 times a day for 14 days. Shake gently. Before first use, prime pump. After use, clean tip  and replace cap. (Patient taking differently: Administer 1 spray into each nostril if needed for allergies or rhinitis. Shake gently. Before first use, prime pump. After use, clean tip and replace cap.) 16 g 0     No current facility-administered medications for this visit.

## 2025-04-30 LAB
ALBUMIN SERPL-MCNC: 4.3 G/DL (ref 3.6–5.1)
ALBUMIN/GLOB SERPL: 1.5 (CALC) (ref 1–2.5)
ALP SERPL-CCNC: 121 U/L (ref 36–130)
ALT SERPL-CCNC: 56 U/L (ref 9–46)
ANION GAP SERPL CALCULATED.4IONS-SCNC: 15 MMOL/L (CALC) (ref 7–17)
AST SERPL-CCNC: 115 U/L (ref 10–40)
BASOPHILS # BLD AUTO: 18 CELLS/UL (ref 0–200)
BASOPHILS NFR BLD AUTO: 0.4 %
BILIRUB DIRECT SERPL-MCNC: 0.3 MG/DL
BILIRUB INDIRECT SERPL-MCNC: 0.8 MG/DL (CALC) (ref 0.2–1.2)
BILIRUB SERPL-MCNC: 1.1 MG/DL (ref 0.2–1.2)
BUN SERPL-MCNC: 8 MG/DL (ref 7–25)
BUN/CREAT SERPL: NORMAL (CALC) (ref 6–22)
CALCIUM SERPL-MCNC: 9.8 MG/DL (ref 8.6–10.3)
CHLORIDE SERPL-SCNC: 102 MMOL/L (ref 98–110)
CHOLEST SERPL-MCNC: 199 MG/DL
CHOLEST/HDLC SERPL: 4.7 (CALC)
CO2 SERPL-SCNC: 24 MMOL/L (ref 20–32)
CREAT SERPL-MCNC: 0.75 MG/DL (ref 0.6–1.29)
EGFRCR SERPLBLD CKD-EPI 2021: 111 ML/MIN/1.73M2
EOSINOPHIL # BLD AUTO: 51 CELLS/UL (ref 15–500)
EOSINOPHIL NFR BLD AUTO: 1.1 %
ERYTHROCYTE [DISTWIDTH] IN BLOOD BY AUTOMATED COUNT: 12.5 % (ref 11–15)
EST. AVERAGE GLUCOSE BLD GHB EST-MCNC: 103 MG/DL
EST. AVERAGE GLUCOSE BLD GHB EST-SCNC: 5.7 MMOL/L
GLOBULIN SER CALC-MCNC: 2.8 G/DL (CALC) (ref 1.9–3.7)
GLUCOSE SERPL-MCNC: 126 MG/DL (ref 65–139)
HBA1C MFR BLD: 5.2 %
HCT VFR BLD AUTO: 41.3 % (ref 38.5–50)
HDLC SERPL-MCNC: 42 MG/DL
HFE GENE MUT ANL BLD/T: NORMAL
HGB BLD-MCNC: 14.3 G/DL (ref 13.2–17.1)
INR PPP: 1.1
LDLC SERPL CALC-MCNC: 120 MG/DL (CALC)
LYMPHOCYTES # BLD AUTO: 1297 CELLS/UL (ref 850–3900)
LYMPHOCYTES NFR BLD AUTO: 28.2 %
MCH RBC QN AUTO: 35 PG (ref 27–33)
MCHC RBC AUTO-ENTMCNC: 34.6 G/DL (ref 32–36)
MCV RBC AUTO: 101.2 FL (ref 80–100)
MONOCYTES # BLD AUTO: 281 CELLS/UL (ref 200–950)
MONOCYTES NFR BLD AUTO: 6.1 %
NEUTROPHILS # BLD AUTO: 2953 CELLS/UL (ref 1500–7800)
NEUTROPHILS NFR BLD AUTO: 64.2 %
NONHDLC SERPL-MCNC: 157 MG/DL (CALC)
PLATELET # BLD AUTO: 98 THOUSAND/UL (ref 140–400)
PMV BLD REES-ECKER: 9.6 FL (ref 7.5–12.5)
POTASSIUM SERPL-SCNC: 3.7 MMOL/L (ref 3.5–5.3)
PROT SERPL-MCNC: 7.1 G/DL (ref 6.1–8.1)
PROTHROMBIN TIME: 11.9 SEC (ref 9–11.5)
RBC # BLD AUTO: 4.08 MILLION/UL (ref 4.2–5.8)
SERVICE CMNT-IMP: ABNORMAL
SODIUM SERPL-SCNC: 141 MMOL/L (ref 135–146)
TRIGL SERPL-MCNC: 261 MG/DL
WBC # BLD AUTO: 4.6 THOUSAND/UL (ref 3.8–10.8)

## 2025-05-06 ENCOUNTER — HOSPITAL ENCOUNTER (OUTPATIENT)
Dept: RADIOLOGY | Facility: CLINIC | Age: 50
End: 2025-05-06
Payer: COMMERCIAL

## 2025-05-06 DIAGNOSIS — K76.0 FATTY LIVER: ICD-10-CM

## 2025-05-06 PROCEDURE — 76705 ECHO EXAM OF ABDOMEN: CPT

## 2025-05-06 PROCEDURE — 76705 ECHO EXAM OF ABDOMEN: CPT | Performed by: STUDENT IN AN ORGANIZED HEALTH CARE EDUCATION/TRAINING PROGRAM

## 2025-05-09 ENCOUNTER — APPOINTMENT (OUTPATIENT)
Dept: PRIMARY CARE | Facility: CLINIC | Age: 50
End: 2025-05-09
Payer: COMMERCIAL

## 2025-05-09 LAB
CHOLEST SERPL-MCNC: 199 MG/DL
CHOLEST/HDLC SERPL: 4.7 (CALC)
EST. AVERAGE GLUCOSE BLD GHB EST-MCNC: 103 MG/DL
EST. AVERAGE GLUCOSE BLD GHB EST-SCNC: 5.7 MMOL/L
HBA1C MFR BLD: 5.2 %
HDLC SERPL-MCNC: 42 MG/DL
HFE GENE MUT ANL BLD/T: NORMAL
LDLC SERPL CALC-MCNC: 120 MG/DL (CALC)
NONHDLC SERPL-MCNC: 157 MG/DL (CALC)
TRIGL SERPL-MCNC: 261 MG/DL

## 2025-05-30 ENCOUNTER — CLINICAL SUPPORT (OUTPATIENT)
Dept: GASTROENTEROLOGY | Facility: CLINIC | Age: 50
End: 2025-05-30
Payer: COMMERCIAL

## 2025-05-30 DIAGNOSIS — K76.0 FATTY LIVER: ICD-10-CM

## 2025-05-30 PROCEDURE — 91200 LIVER ELASTOGRAPHY: CPT | Performed by: INTERNAL MEDICINE

## 2025-06-02 ENCOUNTER — TELEPHONE (OUTPATIENT)
Dept: GASTROENTEROLOGY | Facility: CLINIC | Age: 50
End: 2025-06-02
Payer: COMMERCIAL

## 2025-06-02 DIAGNOSIS — K76.0 FATTY LIVER: ICD-10-CM

## 2025-06-02 DIAGNOSIS — R74.8 ELEVATED LIVER ENZYMES: ICD-10-CM

## 2025-06-02 NOTE — TELEPHONE ENCOUNTER
Called patient to discuss the following results and plan per Dr Forrester:  HEPATOLOGY ATTENDING NOTE     I personally reviewed and interpreted the FibroSCAN test.     It revealed a median liver stiffness of 75 kPa with an IQR of 0% and CAP   358.     This is consistent with stage 4 fibrosis or cirrhosis and severe   steatosis.     We will recommend a liver biopsy and RTC after the biopsy.     SERGIO Forrester.

## 2025-06-05 ENCOUNTER — PATIENT MESSAGE (OUTPATIENT)
Dept: GASTROENTEROLOGY | Facility: CLINIC | Age: 50
End: 2025-06-05
Payer: COMMERCIAL

## 2025-06-05 DIAGNOSIS — K76.0 FATTY LIVER: ICD-10-CM

## 2025-06-05 DIAGNOSIS — R74.8 ELEVATED LIVER ENZYMES: ICD-10-CM

## 2025-06-10 ENCOUNTER — HOSPITAL ENCOUNTER (OUTPATIENT)
Dept: RADIOLOGY | Facility: HOSPITAL | Age: 50
Discharge: HOME | End: 2025-06-10
Payer: COMMERCIAL

## 2025-06-10 VITALS
HEART RATE: 80 BPM | SYSTOLIC BLOOD PRESSURE: 126 MMHG | RESPIRATION RATE: 16 BRPM | DIASTOLIC BLOOD PRESSURE: 86 MMHG | OXYGEN SATURATION: 96 % | TEMPERATURE: 98.8 F

## 2025-06-10 DIAGNOSIS — R74.8 ELEVATED LIVER ENZYMES: ICD-10-CM

## 2025-06-10 DIAGNOSIS — K76.0 FATTY LIVER: ICD-10-CM

## 2025-06-10 PROCEDURE — 7100000009 HC PHASE TWO TIME - INITIAL BASE CHARGE

## 2025-06-10 PROCEDURE — 2500000004 HC RX 250 GENERAL PHARMACY W/ HCPCS (ALT 636 FOR OP/ED): Performed by: RADIOLOGY

## 2025-06-10 PROCEDURE — 2720000007 HC OR 272 NO HCPCS

## 2025-06-10 PROCEDURE — 99152 MOD SED SAME PHYS/QHP 5/>YRS: CPT

## 2025-06-10 PROCEDURE — 7100000010 HC PHASE TWO TIME - EACH INCREMENTAL 1 MINUTE

## 2025-06-10 PROCEDURE — 76942 ECHO GUIDE FOR BIOPSY: CPT

## 2025-06-10 RX ORDER — LIDOCAINE HYDROCHLORIDE 10 MG/ML
INJECTION, SOLUTION EPIDURAL; INFILTRATION; INTRACAUDAL; PERINEURAL
Status: COMPLETED | OUTPATIENT
Start: 2025-06-10 | End: 2025-06-10

## 2025-06-10 RX ORDER — MIDAZOLAM HYDROCHLORIDE 1 MG/ML
INJECTION INTRAMUSCULAR; INTRAVENOUS
Status: COMPLETED | OUTPATIENT
Start: 2025-06-10 | End: 2025-06-10

## 2025-06-10 RX ORDER — LEVOCETIRIZINE DIHYDROCHLORIDE 5 MG/1
5 TABLET, FILM COATED ORAL EVERY EVENING
COMMUNITY

## 2025-06-10 RX ORDER — FENTANYL CITRATE 50 UG/ML
INJECTION, SOLUTION INTRAMUSCULAR; INTRAVENOUS
Status: COMPLETED | OUTPATIENT
Start: 2025-06-10 | End: 2025-06-10

## 2025-06-10 RX ADMIN — FENTANYL CITRATE 50 MCG: 50 INJECTION, SOLUTION INTRAMUSCULAR; INTRAVENOUS at 10:19

## 2025-06-10 RX ADMIN — MIDAZOLAM HYDROCHLORIDE 1 MG: 1 INJECTION, SOLUTION INTRAMUSCULAR; INTRAVENOUS at 10:19

## 2025-06-10 RX ADMIN — FENTANYL CITRATE 50 MCG: 50 INJECTION, SOLUTION INTRAMUSCULAR; INTRAVENOUS at 10:13

## 2025-06-10 RX ADMIN — LIDOCAINE HYDROCHLORIDE 10 ML: 10 INJECTION, SOLUTION EPIDURAL; INFILTRATION; INTRACAUDAL; PERINEURAL at 10:19

## 2025-06-10 RX ADMIN — MIDAZOLAM HYDROCHLORIDE 1 MG: 1 INJECTION, SOLUTION INTRAMUSCULAR; INTRAVENOUS at 10:13

## 2025-06-10 ASSESSMENT — PAIN SCALES - GENERAL
PAINLEVEL_OUTOF10: 0 - NO PAIN
PAINLEVEL_OUTOF10: 4
PAINLEVEL_OUTOF10: 0 - NO PAIN
PAINLEVEL_OUTOF10: 0 - NO PAIN
PAINLEVEL_OUTOF10: 2
PAINLEVEL_OUTOF10: 0 - NO PAIN

## 2025-06-10 ASSESSMENT — PAIN DESCRIPTION - DESCRIPTORS
DESCRIPTORS: TENDER
DESCRIPTORS: TENDER

## 2025-06-10 ASSESSMENT — PAIN - FUNCTIONAL ASSESSMENT
PAIN_FUNCTIONAL_ASSESSMENT: 0-10

## 2025-06-10 NOTE — DISCHARGE INSTRUCTIONS
Liver Biopsy    A liver biopsy is a test used to help doctors diagnose liver disease. It help doctors decide our  medical treatment. A small piece of tissue is taken from the liver using a special kind of needle.  The tissue is sent to the lab to be looked at under a microscope.     Before the Test:  ? If you take aspirin, medications containing aspirin, Plavix, or Coumadin, you MUST ask your doctor when you should stop taking the medicine. You may need to stop taking the medicine up to 7 days before the test.  ? You will have a blood sample drawn.  ? DO NOT DRINK OR EAT ANYTHING after midnight the day before the test (unless your doctor tells you otherwise).  ? You should take any medications you normally take (other than aspirin, Coumadin, etc.) with a small amount of clear liquid. Be sure to take any high blood pressure medications that your physicians has prescribed.  ? If you have diabetes, ask your Radiologist or Radiology Nurse if you should take your medicine or adjust the dosage before the test.  ? If this is a CT guided biopsy, please inform your doctor if you have an allergy to iodine or iodinated contrast. Special pills may be need to be prescribed prior to the test.      During the Test:  ? You will lie on your back.  ? You may be given medicine that will make your drowsy.  ? You will also be given a medication to numb the biopsy site.  ? You may feel pressure during the biopsy.       After the Test:  ? You will remain in bed 4 to 6 hours.  ? Your blood pressure, pulse and biopsy site will be checked often.     Follow these guidelines for a safe recovery:  ? Activity:  o The physician may have you lie on your right side for 2 hours.  o Limit activity for 24 hours after the test.  o No driving for 24 hours. You’ll need someone to drive you home. Someone should also stay with you the night of the biopsy.  o No heavy lifting or strenuous activity for 2-3 days. Avoid intense exercise and contact sports for  2 weeks.  ? You may resume your normal diet.  ? Medicines:  o If you take aspirin, Coumadin, or medications that contain aspirin, ask your doctor when you should start medications after the test.  o You may take your other medications or ordered by your doctor.    Sedation Instructions:   If you’re given medicine to help you relax during the test (called a sedative), do not drive,  operate or use any heavy machinery, or drink alcohol for 24 hours after the test.     Call your ordering doctor RIGHT AWAY if you have:  ? Bleeding from the biopsy site. If bleeding occurs, put firm pressure on the area by lying on your right side on a hard, firm surface.  ? Shortness of breath or trouble breathing.  ? Increased pain at the biopsy site.  ? Dizziness or fainting.  ? If you are not able to contact your doctor, go to the nearest Emergency Room.     Also call your Doctor if you have:  ? Redness, swelling, pus-like drainage, or fluid at the procedure site.  ? Fever over 101 degrees F or night sweats.  ? Pain or tenderness at the biopsy site.  ? Any questions.    Keep dressing on for 2 days. Check the site for any signs of infection after removing the dressing. You may shower with the dressing on, but no baths/swimming/submerging underwater while dressing is in place. No heavy lifting (max 10lbs) for 2-3 days following the procedure. No strenuous activity or exercise for 1 week following the procedure.   If you have received sedation today, no driving, operating heavy machinery, drinking alcohol or making any important decisions for 24 hours post procedure.

## 2025-06-11 ENCOUNTER — APPOINTMENT (OUTPATIENT)
Dept: PRIMARY CARE | Facility: CLINIC | Age: 50
End: 2025-06-11
Payer: COMMERCIAL

## 2025-06-11 VITALS
HEART RATE: 78 BPM | SYSTOLIC BLOOD PRESSURE: 134 MMHG | DIASTOLIC BLOOD PRESSURE: 74 MMHG | TEMPERATURE: 98.2 F | WEIGHT: 299.8 LBS | BODY MASS INDEX: 36.49 KG/M2 | OXYGEN SATURATION: 94 %

## 2025-06-11 DIAGNOSIS — E66.812 CLASS 2 SEVERE OBESITY WITH SERIOUS COMORBIDITY AND BODY MASS INDEX (BMI) OF 35.0 TO 35.9 IN ADULT, UNSPECIFIED OBESITY TYPE: ICD-10-CM

## 2025-06-11 DIAGNOSIS — J30.2 SEASONAL ALLERGIES: ICD-10-CM

## 2025-06-11 DIAGNOSIS — F10.90 ALCOHOL USE DISORDER: ICD-10-CM

## 2025-06-11 DIAGNOSIS — E87.1 HYPONATREMIA: ICD-10-CM

## 2025-06-11 DIAGNOSIS — J30.89 ENVIRONMENTAL AND SEASONAL ALLERGIES: ICD-10-CM

## 2025-06-11 DIAGNOSIS — K76.0 FATTY LIVER: ICD-10-CM

## 2025-06-11 DIAGNOSIS — I10 PRIMARY HYPERTENSION: Primary | ICD-10-CM

## 2025-06-11 DIAGNOSIS — E66.01 CLASS 2 SEVERE OBESITY WITH SERIOUS COMORBIDITY AND BODY MASS INDEX (BMI) OF 35.0 TO 35.9 IN ADULT, UNSPECIFIED OBESITY TYPE: ICD-10-CM

## 2025-06-11 DIAGNOSIS — R73.01 IMPAIRED FASTING BLOOD SUGAR: ICD-10-CM

## 2025-06-11 DIAGNOSIS — D69.6 THROMBOCYTOPENIA: ICD-10-CM

## 2025-06-11 DIAGNOSIS — Z23 ENCOUNTER TO VACCINATE PATIENT: ICD-10-CM

## 2025-06-11 DIAGNOSIS — F41.9 ANXIETY: ICD-10-CM

## 2025-06-11 DIAGNOSIS — E78.1 HYPERTRIGLYCERIDEMIA: ICD-10-CM

## 2025-06-11 DIAGNOSIS — F32.A DEPRESSION, UNSPECIFIED DEPRESSION TYPE: ICD-10-CM

## 2025-06-11 DIAGNOSIS — K70.9 ALCOHOLIC LIVER DISEASE: ICD-10-CM

## 2025-06-11 PROCEDURE — 3075F SYST BP GE 130 - 139MM HG: CPT | Performed by: NURSE PRACTITIONER

## 2025-06-11 PROCEDURE — 90739 HEPB VACC 2/4 DOSE ADULT IM: CPT | Performed by: NURSE PRACTITIONER

## 2025-06-11 PROCEDURE — 99214 OFFICE O/P EST MOD 30 MIN: CPT | Performed by: NURSE PRACTITIONER

## 2025-06-11 PROCEDURE — 90471 IMMUNIZATION ADMIN: CPT | Performed by: NURSE PRACTITIONER

## 2025-06-11 PROCEDURE — 3078F DIAST BP <80 MM HG: CPT | Performed by: NURSE PRACTITIONER

## 2025-06-11 PROCEDURE — 90472 IMMUNIZATION ADMIN EACH ADD: CPT | Performed by: NURSE PRACTITIONER

## 2025-06-11 PROCEDURE — 90632 HEPA VACCINE ADULT IM: CPT | Performed by: NURSE PRACTITIONER

## 2025-06-11 ASSESSMENT — ENCOUNTER SYMPTOMS
NUMBNESS: 0
DIARRHEA: 0
PALPITATIONS: 0
CHEST TIGHTNESS: 0
WEAKNESS: 0
FEVER: 0
DIZZINESS: 0
NAUSEA: 0
SHORTNESS OF BREATH: 0
FATIGUE: 0
CHILLS: 0
VOMITING: 0
ABDOMINAL PAIN: 0
HEADACHES: 0

## 2025-06-11 NOTE — POST-PROCEDURE NOTE
Interventional Radiology Brief Postprocedure Note    Attending: Blayne Escobar MD     Assistant: none    Diagnosis: hepatic steatosis    Description of procedure: Successful US guided core biopsy of the right hepatic lobe.  A single core(s) obtained and samples were placed in formalin. Gel foam was placed in the bx tract for hemostasis. Post biopsy imaging showed no hemorrhage.      Anesthesia:  moderate sedation    Complications: None    Estimated Blood Loss: minimal    Medications (Filter: Administrations occurring from 0907 to 1030 on 06/10/25) As of 06/10/25 1030      fentaNYL PF (Sublimaze) injection (mcg) Total dose:  100 mcg      Date/Time Rate/Dose/Volume Action       06/10/25  1013 50 mcg Given      1019 50 mcg Given               midazolam (Versed) injection (mg) Total dose:  2 mg      Date/Time Rate/Dose/Volume Action       06/10/25  1013 1 mg Given      1019 1 mg Given               lidocaine PF (Xylocaine) 10 mg/mL (1 %) injection (mL) Total volume:  10 mL      Date/Time Rate/Dose/Volume Action       06/10/25  1019 10 mL Given                   ID Type Source Tests Collected by Time   1 : Random Rt Liver needle Bx Tissue LIVER BIOPSY RIGHT SURGICAL PATHOLOGY EXAM Blayne Escobar MD 6/10/2025 1022         See detailed result report with images in PACS.    The patient tolerated the procedure well without incident or complication and is in stable condition.

## 2025-06-11 NOTE — PRE-PROCEDURE NOTE
Interventional Radiology Preprocedure Note    Indication for procedure: Diagnoses of Elevated liver enzymes and Fatty liver were pertinent to this visit.    Relevant review of systems: NA    Relevant Labs:   Lab Results   Component Value Date    CREATININE 0.75 04/29/2025    EGFR 111 04/29/2025    INR 1.1 04/29/2025    PROTIME 11.9 (H) 04/29/2025       Planned Sedation/Anesthesia: Moderate    Airway assessment: normal    Directed physical examination:    CV: RRR  Resp: normal rate    Mallampati: I (soft palate, uvula, fauces, and tonsillar pillars visible)    ASA Score: ASA 3 - Patient with moderate systemic disease with functional limitations    Benefits, risks and alternatives of procedure and planned sedation have been discussed with the patient and/or their representative. All questions answered and they agree to proceed.

## 2025-06-11 NOTE — PATIENT INSTRUCTIONS
Focus on a low sodium/low fat diet (whole grains, fresh fruits and vegetables, lean meats). Avoid foods high in sugar.  Increase exercise as tolerated.  Continue medications at the current dose.  You received the second dose of Hepatitis A and Hepatitis B today.   Follow up in 6 months or sooner if needed.

## 2025-06-11 NOTE — PROGRESS NOTES
"Subjective   Chano Smith is a 50 y.o. male who presents for 6 mon fu.    HPI  He presents to the office today for a 6 month follow up and medication refills.   He had a liver biopsy completed yesterday. He is following with hepatology.  He is tolerating medications well at current dose- no side effects. No chest pain, shortness of breath, palpitations or edema. Does notice some edema at sockline at times but mild. No numbness, tingling, weakness or vision changes.  No dizziness.  Home blood pressure readings: No recent home BP checks.  Last eye exam:12/2024  Diet: eating more fruits and vegetables throughout the day  Does not always eat breakfast.  Has to watch portions at dinner  Exercise: No regular exercise  Weight: fairly stable  Alcohol use:several shot glasses of liquor nightly- does not count how many.  He continues to follow with psychiatry.      He reports he continues with a runny nose, nasal congestion, cough, sneezing. Tends to be worse after raining.  Has had issues with allergies for a long time. He is interested in seeing an allergist again.  Had allergy shots years ago.    Last labs: Reviewed from 4/2025  Lab Results   Component Value Date    GLUCOSE 126 04/29/2025    CALCIUM 9.8 04/29/2025     04/29/2025    K 3.7 04/29/2025    CO2 24 04/29/2025     04/29/2025    BUN 8 04/29/2025    CREATININE 0.75 04/29/2025      Lab Results   Component Value Date    ALT 56 (H) 04/29/2025      Lab Results   Component Value Date    ALKPHOS 121 04/29/2025      Lab Results   Component Value Date    CHOL 199 04/29/2025    CHOL 263 (H) 02/21/2023    CHOL 236 (H) 02/10/2021     Lab Results   Component Value Date    HDL 42 04/29/2025    HDL 46.8 02/21/2023    HDL 57.5 02/10/2021     Lab Results   Component Value Date    LDLCALC 120 (H) 04/29/2025     Lab Results   Component Value Date    TRIG 261 (H) 04/29/2025    TRIG 615 (H) 02/21/2023    TRIG 151 (H) 02/10/2021     No components found for: \"CHOLHDL\"   Lab " Results   Component Value Date    WBC 4.6 04/29/2025    HGB 14.3 04/29/2025    HCT 41.3 04/29/2025    .2 (H) 04/29/2025    PLT 98 (L) 04/29/2025      Lab Results   Component Value Date    HGBA1C 5.2 04/29/2025      Colonoscopy:due in 2/2033    Review of Systems   Constitutional:  Negative for chills, fatigue and fever.   HENT:  Positive for congestion, postnasal drip, rhinorrhea and sneezing.    Eyes:  Positive for itching. Negative for visual disturbance.   Respiratory:  Positive for cough. Negative for chest tightness, shortness of breath and wheezing.    Cardiovascular:  Negative for chest pain, palpitations and leg swelling.   Gastrointestinal:  Negative for abdominal pain, diarrhea, nausea and vomiting.   Neurological:  Negative for dizziness, weakness, numbness and headaches.       Objective   /74 (BP Location: Left arm, Patient Position: Sitting)   Pulse 78   Temp 36.8 °C (98.2 °F) (Temporal)   Wt 136 kg (299 lb 12.8 oz)   SpO2 94%   BMI 36.49 kg/m²     Physical Exam  Constitutional:       General: He is not in acute distress.     Appearance: Normal appearance. He is not toxic-appearing.   Eyes:      Extraocular Movements: Extraocular movements intact.      Conjunctiva/sclera: Conjunctivae normal.      Pupils: Pupils are equal, round, and reactive to light.   Cardiovascular:      Rate and Rhythm: Normal rate and regular rhythm.      Pulses: Normal pulses.      Heart sounds: Normal heart sounds, S1 normal and S2 normal. No murmur heard.  Pulmonary:      Effort: Pulmonary effort is normal. No respiratory distress.      Breath sounds: Normal breath sounds.   Abdominal:      General: Bowel sounds are normal.      Palpations: Abdomen is soft.      Tenderness: There is no abdominal tenderness.   Musculoskeletal:      Right lower leg: No edema.      Left lower leg: No edema.   Lymphadenopathy:      Cervical: No cervical adenopathy.   Neurological:      Mental Status: He is alert and oriented to  person, place, and time.   Psychiatric:         Attention and Perception: Attention normal.         Mood and Affect: Mood and affect normal.         Behavior: Behavior normal. Behavior is cooperative.         Thought Content: Thought content normal.         Cognition and Memory: Cognition normal.         Judgment: Judgment normal.         Assessment/Plan   Problem List Items Addressed This Visit       Hypertriglyceridemia    This has had an upward trend. Discussed dietary/lifestyle modifications.         Primary hypertension - Primary    Well controlled today on current medications. Continue.         Anxiety    Follows with psychiatry and psychology.         Depression    Follows with psychiatry and psychology.         Fatty liver    He is following with hepatology.         Impaired fasting blood sugar    Recent hemoglobin A1C was good. Discussed continuing to focus on a healthy diet and exercise.         Thrombocytopenia    Stable on recent labs.         Seasonal allergies    Alcoholic liver disease    He is following with hepatology.         Class 2 severe obesity with serious comorbidity and body mass index (BMI) of 35.0 to 35.9 in adult    Will review liver biopsy results and then consider Glp 1 agonist.         Hyponatremia    Normal on recent labs.         Alcohol use disorder    Recommended/discussed a referral to addiction medication. Discussed the importance of safely stopping alcohol use given liver disease. He has been referred in the past but did not go. He will let me know if he is interested.          Other Visit Diagnoses         Encounter to vaccinate patient        Relevant Orders    Hepatitis A vaccine, age 19 years and greater (HAVRIX) (Completed)    Hepatitis B vaccine, 18yrs and older (HEPLISAV) (Completed)      Environmental and seasonal allergies        Relevant Orders    Referral to Allergy          It has been a pleasure seeing you today!

## 2025-06-12 PROBLEM — F10.90 ALCOHOL USE DISORDER: Status: ACTIVE | Noted: 2025-06-12

## 2025-06-12 ASSESSMENT — ENCOUNTER SYMPTOMS
EYE ITCHING: 1
WHEEZING: 0
COUGH: 1
RHINORRHEA: 1

## 2025-06-12 NOTE — ASSESSMENT & PLAN NOTE
Recommended/discussed a referral to addiction medication. Discussed the importance of safely stopping alcohol use given liver disease. He has been referred in the past but did not go. He will let me know if he is interested.

## 2025-06-16 LAB
LABORATORY COMMENT REPORT: NORMAL
PATH REPORT.FINAL DX SPEC: NORMAL
PATH REPORT.GROSS SPEC: NORMAL
PATH REPORT.TOTAL CANCER: NORMAL

## 2025-07-08 ENCOUNTER — OFFICE VISIT (OUTPATIENT)
Dept: GASTROENTEROLOGY | Facility: CLINIC | Age: 50
End: 2025-07-08
Payer: COMMERCIAL

## 2025-07-08 VITALS
HEIGHT: 76 IN | WEIGHT: 297 LBS | OXYGEN SATURATION: 95 % | SYSTOLIC BLOOD PRESSURE: 147 MMHG | DIASTOLIC BLOOD PRESSURE: 82 MMHG | HEART RATE: 87 BPM | BODY MASS INDEX: 36.17 KG/M2

## 2025-07-08 DIAGNOSIS — K76.0 FATTY LIVER: Primary | ICD-10-CM

## 2025-07-08 DIAGNOSIS — R74.8 ELEVATED LIVER ENZYMES: ICD-10-CM

## 2025-07-08 DIAGNOSIS — K76.0 FATTY LIVER: ICD-10-CM

## 2025-07-08 PROCEDURE — 3008F BODY MASS INDEX DOCD: CPT | Performed by: INTERNAL MEDICINE

## 2025-07-08 PROCEDURE — 99212 OFFICE O/P EST SF 10 MIN: CPT

## 2025-07-08 PROCEDURE — 3077F SYST BP >= 140 MM HG: CPT | Performed by: INTERNAL MEDICINE

## 2025-07-08 PROCEDURE — 99214 OFFICE O/P EST MOD 30 MIN: CPT | Performed by: INTERNAL MEDICINE

## 2025-07-08 PROCEDURE — 3079F DIAST BP 80-89 MM HG: CPT | Performed by: INTERNAL MEDICINE

## 2025-07-08 RX ORDER — QUETIAPINE FUMARATE 25 MG/1
TABLET, FILM COATED ORAL
COMMUNITY
Start: 2025-06-26

## 2025-07-08 ASSESSMENT — PAIN SCALES - GENERAL: PAINLEVEL_OUTOF10: 0-NO PAIN

## 2025-07-08 NOTE — PROGRESS NOTES
"HEPATOLOGY RETURN PATIENT VISIT    July 8, 2025    Dr. Aleah Donahue       Patient Name:   CHANO MARIA  Medical Record Number: [id number]  YOB: 1975    Dear Dr. Donahue,    I had the pleasure of seeing Chano Maria for follow-up evaluation in the [facility] ([facility] office). History and physical examination was performed. Pertinent available laboratory, imaging, pathology results were reviewed.     History of Present Illness:   The patient is a 49 year old white male who is referred for follow-up evaluation of fatty liver disease and alcoholic liver disease.    The patient has risk factors for fatty liver disease including alcohol abuse, hyperlipidemia, and obesity.  He has never been on therapy for hyperlipidemia.  He has never been diagnosed with diabetes.  His weight has been relatively stable for the last few years.    The patient denied any past history of acute hepatitis or jaundice.      He has never had any manifestations of liver disease including no jaundice, ascites, hepatic encephalopathy, or variceal bleeding.     He initially saw me in clinic on 2/21/2023.  At that time, I did additional evaluation (see results below).      After he initially saw me, he presented to [facility] with nausea, vomiting and abdominal pain.  He was found to have gallstones.  He underwent a laparoscopic cholecystectomy 1/31/2024.  Unfortunately, the surgeon made no comment about the gross appearance of the liver and for unclear reasons did not do a liver biopsy.  He believes that the surgeon told him that everything looked fine during the surgery.    Despite all of his issues and seeing me in discussing alcohol abstinence, he has continued to drink heavily.  In the note from his PCP on 11/6/2024 he was drinking \"too much” on a nightly basis.    He did not see me in clinic from 2/21/2023 through 4/29/2025.  When he returned on 4/29/2025, I had him get additional workup which was " worrisome for cirrhosis.  Because of that, I had him undergo a liver biopsy.  This was done on 6/10/2025 without complications.  Luckily, it did not reveal cirrhosis but probably revealed about stage 2 fibrosis.    He presented today for follow-up evaluation.  He denied any specific liver-related complaints.  He does sometimes get GERD symptoms.  He denied any GI bleeding.    Past Medical/Surgical History:   ? Spleen enlarged (789.2) (R16.1)  ? Thrombocytopenia (287.5) (D69.6)  ? Alcohol abuse (305.00) (F10.10)  ? Insomnia (780.52) (G47.00)  ? HTN (hypertension) (401.9) (I10)  ? Hypercalcemia (275.42) (E83.52)  ? Anxiety (300.00) (F41.9)  ? Depression (311) (F32.A)  ? Fatty liver (571.8) (K76.0)  ? Allergic rhinitis (477.9) (J30.9)  ? Allergic rhinitis due to dust mite (477.8) (J30.89)  ? Allergic rhinitis due to pollen (477.0) (J30.1)  ? Anxiety (300.00) (F41.9)  ? Body mass index (BMI) of 35.0 to 35.9 in adult (V85.35) (Z68.35)  ? Class 1 obesity with body mass index (BMI) of 34.0 to 34.9 in adult (278.00,V85.34)  ? Colon cancer screening (V76.51) (Z12.11)  ? Depression (311) (F32.A)  ? Drug-induced hypokalemia (276.8,E947.9) (E87.6,T50.905A)  ? Elevated liver enzymes (790.5) (R74.8)  ? Encounter for immunization (V03.89) (Z23)  ? HTN (hypertension) (401.9) (I10)  ? Hypokalemia (276.8) (E87.6)  ? Hypomagnesemia (275.2) (E83.42)  ? Impaired fasting blood sugar (790.21) (R73.01)  ? Leukopenia (288.50) (D72.819)  ? Rhinitis (472.0) (J31.0)  ? Seasonal allergies (477.9) (J30.2)  ? Thrombocytopenia (287.5) (D69.6)  ? Transient elevated blood pressure (796.2) (R03.0)  ? History of Tonsillectomy  ? History of Vasectomy  Cholecystectomy    Family History:   ? Family history of lung cancer (V16.1) (Z80.1)  ? Family history of amyotrophic lateral sclerosis (V17.2) (Z82.0)  There is no known family history of liver disease.  There is no known family history of colon cancer.  His father had prostate cancer and lung  cancer.    Social History:   He has a history of alcohol abuse.  He was drinking up to 3 bottles of rum per week.  He has been trying to significantly decrease his alcohol intake.  He still drinks but did not quantify how much.  He does admit that he probably drinks too much.  He denied tobacco use.  He denied blood transfusions.  He denied tattoos.  He denied intravenous drug use.  He works for the [employer].    Review of systems: As noted above.  He does have dry skin.  He does have anxiety and depression.  He has been getting shortness of breath with exertion.  He does get intermittent GERD symptoms.  He has been getting right upper quadrant pain and right flank pain when he stands up.  No fever, chills, weight loss, visual changes, auditory changes, shortness of breath, chest pain, GI bleeding, diarrhea, constipation, dysuria, hematuria, musculoskeletal issues, or rash.       Medical, Surgical, Family, and Social Histories  Medical History[1]    Surgical History[2]    Family History[3]    Social History     Socioeconomic History    Marital status:      Spouse name: Not on file    Number of children: Not on file    Years of education: Not on file    Highest education level: Not on file   Occupational History    Not on file   Tobacco Use    Smoking status: Never     Passive exposure: Never    Smokeless tobacco: Former     Types: Chew     Quit date: 10/6/2006   Vaping Use    Vaping status: Never Used   Substance and Sexual Activity    Alcohol use: Yes    Drug use: Never    Sexual activity: Yes     Partners: Female     Birth control/protection: Male Sterilization   Other Topics Concern    Not on file   Social History Narrative    Not on file     Social Drivers of Health     Financial Resource Strain: Not on file   Food Insecurity: Not on file   Transportation Needs: Not on file   Physical Activity: Not on file   Stress: Not on file   Social Connections: Not on file   Intimate Partner  "Violence: Not on file   Housing Stability: Not on file       Allergies and Current Medications  Allergies[4]  Current Medications[5]     Physical Exam  /82   Pulse 87   Ht 1.93 m (6' 4\")   Wt 135 kg (297 lb)   SpO2 95%   BMI 36.15 kg/m²       Physical Examination:   Deferred as we spent over 20 minutes discussing results and plans.    Labs 4/29/2025 hemochromatosis genetic test showed 1 copy of the H63D allele, hemoglobin A1c 5.2%, cholesterol 199, , triglycerides 261, INR 1.1, protein 7.1, albumin 4.3, alk phos 121, bilirubin 1.1, direct bilirubin 0.3, , ALT 56, glucose 126, sodium 141, creatinine 0.75, WBC 4.6, hemoglobin 14.3, , platelets 98.    Labs 3/20/2024 glucose 119, sodium 142, creatinine 0.72, protein 7.4, albumin 4.4, alk phos 85, bilirubin 0.7, AST 48, ALT 67.    Labs 3/6/2024 lipase 53, WBC 7.2, hemoglobin 16.7, platelets 168.    Labs 11/30/2023 , ALT 72.    Labs 11/17/2023 platelets 111, lipase 257.    Labs 2/21/2023 antimitochondrial antibody negative, hepatitis A antibody negative, hepatitis B surface antigen negative, JUAN negative, ferritin 1060, cholesterol 263, triglyceride 615, AST 67, ALT 56, alpha-1 antitrypsin phenotype MM, iron saturation 41%, INR 1, smooth muscle antibody 40, hepatitis C antibody negative, ceruloplasmin 24, hepatitis B surface antibody negative.    Labs 12/6/2022 WBC 5.4, hemoglobin 15.2, platelets 110, glucose 117, sodium 137, creatinine 1.15, protein 7.2, albumin 4.3, alk phos 94, bilirubin 1.5, AST 91, ALT 75.    Labs 8/29/2022 urine ethyl glucuronide positive @ > 10,000, urine ethyl sulfate positive @ > 10,000, urine drug screen negative    Labs 7/21/2022 glucose 122, sodium 138, creatinine 0.97.    Labs 7/19/2022 protein 7.9, albumin 4, alk phos 82, bilirubin 2, AST 90, ALT 93, WBC 4.4, hemoglobin 15.1, platelets 97, hepatitis B surface antibody negative.    Labs 4/28/2021 AST 45, ALT 72, hemoglobin A1c 5.2%.    Labs 2/10/2021 AST " 88, , cholesterol 236, , triglycerides 151.    Ultrasound 5/6/2025:  IMPRESSION:  1. Hepatomegaly with diffuse steatosis.      CAT scan with contrast 3/6/2024:  IMPRESSION:  No acute pathology.    Ultrasound 11/20/2023:  IMPRESSION:  1. The gallbladder contains stones and sludge. No gallbladder wall  thickening or pericholecystic edema to indicate acute cholecystitis.  2. Hepatomegaly and steatosis.      CAT scan with contrast 7/19/2022 showed hepatosplenomegaly and marked hepatic steatosis, gallbladder sludge, no appendicitis, no acute findings in the abdomen or pelvis.  There was no biliary dilatation.    Ultrasound 8/17/2017 revealed unremarkable ultrasound of the right upper quadrant except for mildly enlarged spleen.    Liver biopsy 6/10/2025:  A. Liver, biopsy:  - Steatohepatitis with multifocal sinusoidal dilatation, congestion, hepatocyte atrophy and extensive pericellular, perivenular and at least periportal fibrosis.  - See microscopic description.     MICROSCOPIC DESCRIPTION:     The biopsy shows preservation of the overall hepatic architecture. Trichrome and reticulin stains highlight extensive pericellular and perivenular fibrosis.  The extent of pericellular fibrosis makes it somewhat difficult to accurately assess the extent of fibrosis but there appears to be at least periportal fibrosis.  The lobular parenchyma shows 40% macrovesicular steatosis along with ballooning degeneration of hepatocytes and Liudmila hyaline inclusions.  There are several foci of necroinflammatory activity throughout the parenchyma.  Additionally, there is azonal, multifocal sinusoidal dilatation with congestion and accompanying hepatocyte atrophy. The reticulin stain highlights parenchymal atrophy within these foci. Cholestasis is not seen.  The portal tracts harbor the usual structures including unremarkable interlobular bile ducts. PAS stain after diastase digestion is negative for diagnostic cytoplasmic  inclusions. Iron stain shows trace storage iron.      The overall findings would be compatible with metabolic dysfunction-associated fatty liver disease with features resembling sclerosing hyaline necrosis seen in alcoholic liver disease. The presence of sinusoidal dilatation with accompanying hepatocyte atrophy raises the possibility of a component of vascular compromise. Correlation with all available clinical and radiologic findings remains essential.    FibroScan 5/30/2025 revealed a median liver stiffness of 75 kPa with IQR 0% and .    FibroScan 4/5/2023 revealed a median liver stiffness of 4.8 kPa with IQR 8% and .          Assessment/Plan:     Fatty liver disease  Alcoholic liver disease  Stage 0-1 fibrosis with moderate steatosis on 4/2023 FibroScan, stage 4 fibrosis with severe steatosis on 5/2025 FibroScan  Stage 1-2 fibrosis with steatohepatitis on 6/2025 liver biopsy    The patient is referred to me for follow-up evaluation of fatty liver disease and alcoholic liver disease.    He almost assuredly has a combination of alcoholic fatty liver disease and nonalcoholic fatty liver disease.    I again made it clear to him that he needs to avoid absolutely all alcohol.  I spoke with him about the risk of further damage to his liver disease and even the development of cirrhosis.  He has been referred to an addiction specialist in the past by his PCP.  However, it does not appear that he ever followed through with that.  I again recommended that he consider this.    We again spoke about fatty liver disease. I explained that the risk factors include diabetes, obesity, hyperlipidemia and alcohol use. I explained that he needs to work on diet, weight loss and exercise.  He also needs to work aggressively with his primary provider about hyperlipidemia.  I did explain that 20-25% of patients with LOPEZ may proceed to cirrhosis.    I again reminded him that working on the same risk factors could decrease  his future risk of heart disease and stroke.    He does have hyperlipidemia.  I again encouraged him to speak with his PCP about possible treatment options for this.      Luckily, despite all of these issues, his FibroScan in April 2023 showed essentially no fibrosis.  However, the FibroScan in May 2025 did suggest cirrhosis.  For that reason, we had him undergo a liver biopsy.  It did show steatohepatitis with stage 1-2 fibrosis.    His serologies did show elevated ferritin and borderline iron saturation.  However, his genetic hemochromatosis test only showed 1 copy of the H63D allele.  In addition, the liver biopsy only showed trace iron.  Keep in mind the patients with alcoholic liver disease and nonalcoholic fatty liver disease can have elevated ferritin levels.  There is no reason to recommend phlebotomy at this point.    He can get MELD labs with PET in 6 and 12 months and see me back in 1 year.    He is not immune to hepatitis A or hepatitis B.  His PCP has been giving him these vaccines.  I encouraged him to complete the entire vaccine series through his PCP.    He does get some right-sided discomfort when he stands up.  It is not clear to me whether this is musculoskeletal or not.  A standard ultrasound was not overly revealing other than the fatty liver disease.    Thank you for allowing me to participate in the care of this patient. Please feel free to contact me with any questions regarding their care.     Sincerely,     Conrado Forrester MD, VERONICA, FAASLD, FACG.   Medical Director, Hepatology  Senior Attending Physician  [facility]  [facility]  Professor of Medicine  Division of Gastroenterology and Liver Disease  [employer] [employer]  [address] [place] [id number]  Phone: [phone]  Fax: [phone].      This document was generated with a computerized dictation system.  Because of this, there  could be errors in grammar and/or content.           [1]   Past Medical History:  Diagnosis Date    Allergic Seasonal    Allergic rhinitis due to dust mite 05/08/2023    Allergic rhinitis due to pollen 05/08/2023    Anxiety     Depression Years ago    Hypertension Years ago    Hypomagnesemia 05/08/2023    Peroneal tendonitis of left lower extremity 05/08/2023    Substance abuse Years ago   [2]   Past Surgical History:  Procedure Laterality Date    BIOPSY  06/10/2025    LIVER    CHOLECYSTECTOMY  1/31/2024    CIRCUMCISION, PRIMARY  Years ago    OTHER SURGICAL HISTORY  02/10/2021    Tonsillectomy    OTHER SURGICAL HISTORY  02/10/2021    Vasectomy    TONSILLECTOMY  2015    VASECTOMY  2015   [3]   Family History  Problem Relation Name Age of Onset    No Known Problems Mother      Lung cancer Father Roman Smith     Diverticulitis Father Roman Smith     Cancer Father Roman Smith     ALS Sister      Irritable bowel syndrome Sister      REYNOLD disease Brother      Diverticulosis Father's Sister      Macular degeneration Paternal Grandmother     [4]   Allergies  Allergen Reactions    Acrylic Acid Anaphylaxis    Cortisone Other     Arm felt like it was burning; Sweating all over.   [5]   Current Outpatient Medications   Medication Sig Dispense Refill    amLODIPine (Norvasc) 10 mg tablet Take 1 tablet (10 mg) by mouth once daily. 90 tablet 1    clonazePAM (KlonoPIN) 0.5 mg tablet Take 1 tablet (0.5 mg) by mouth once daily at bedtime.      desvenlafaxine 50 mg 24 hr tablet Take 1 tablet (50 mg) by mouth once daily.      lamoTRIgine (LaMICtal) 25 mg tablet Take 3 tablets (75 mg) by mouth 2 times a day.      levocetirizine (Xyzal) 5 mg tablet Take 1 tablet (5 mg) by mouth once daily in the evening.      naltrexone (Depade) 50 mg tablet Take 1 tablet (50 mg) by mouth once daily.      QUEtiapine (SEROquel) 25 mg tablet TAKE ONE TABLET BY MOUTH EVERY DAY in the evening between 8-9pm      fluticasone (Flonase) 50 mcg/actuation  nasal spray Administer 1 spray into each nostril 2 times a day for 14 days. Shake gently. Before first use, prime pump. After use, clean tip and replace cap. (Patient taking differently: Administer 1 spray into each nostril if needed for allergies or rhinitis. Shake gently. Before first use, prime pump. After use, clean tip and replace cap.) 16 g 0     No current facility-administered medications for this visit.

## 2025-07-08 NOTE — PATIENT INSTRUCTIONS
You need to work on stopping all alcohol use.  I would recommend you see an addictions counselor or an IOP program.    Work on diet, exercise and weight loss.    Speak to your PCP about whether you should be on a cholesterol medicine.    Complete your hepatitis A and hepatitis B vaccines through your PCP.    Get labs in 6 and 12 months.    See me back in clinic in about 1 year.

## 2025-11-06 ENCOUNTER — APPOINTMENT (OUTPATIENT)
Dept: ALLERGY | Facility: CLINIC | Age: 50
End: 2025-11-06
Payer: COMMERCIAL

## 2025-11-07 ENCOUNTER — APPOINTMENT (OUTPATIENT)
Dept: OPHTHALMOLOGY | Age: 50
End: 2025-11-07
Payer: COMMERCIAL

## 2025-12-17 ENCOUNTER — APPOINTMENT (OUTPATIENT)
Dept: PRIMARY CARE | Facility: CLINIC | Age: 50
End: 2025-12-17
Payer: COMMERCIAL

## 2026-05-05 ENCOUNTER — APPOINTMENT (OUTPATIENT)
Dept: GASTROENTEROLOGY | Facility: CLINIC | Age: 51
End: 2026-05-05
Payer: COMMERCIAL

## (undated) DEVICE — ADHESIVE, SKIN, LIQUIBAND EXCEED

## (undated) DEVICE — TOWEL, SURGICAL, NEURO, O/R, 16 X 26, BLUE, STERILE

## (undated) DEVICE — SYSTEM, TROCAR LAP, 5X100MM, SHIELD BLADED, KII ADVANCED FIX ABDOMINAL

## (undated) DEVICE — RETRIEVAL SYSTEM, MONARCH, 10MM DISP ENDOSCOPIC

## (undated) DEVICE — PUMP, STRYKERFLOW 2 & HANDPIECE W/10FT. IRRIGATION TUBING

## (undated) DEVICE — TUBE SET, PNEUMOLAR HEATED, SMOKE EVACU, HIGH-FLOW

## (undated) DEVICE — HOLSTER, JET SAFETY

## (undated) DEVICE — SUTURE, MONOCRYL, 4-0, 18 IN, PS2, UNDYED

## (undated) DEVICE — CLIP, ENDO, CLINCH II, W/RATCHET, ON/OFF, CLINCH II, 5 MM

## (undated) DEVICE — SCOPE WARMER, LAPAROSCOPE, BAG ONLY, LF

## (undated) DEVICE — CANNULA, KII ADVANCED FIXATION, 5X100MM W/SEAL

## (undated) DEVICE — GLOVE, SURGICAL, PROTEXIS PI ORTHO, 7.0, PF, LF

## (undated) DEVICE — PAD, GROUNDING, ELECTROSURGICAL, W/9 FT CABLE, POLYHESIVE II, ADULT, LF

## (undated) DEVICE — SUTURE, VICRYL, 0, 27 IN, UR-6, VIOLET

## (undated) DEVICE — SHEARS, CURVED 5MM, ENDOPATH

## (undated) DEVICE — HEMOSTAT, ARISTA, ABSORBABLE, 3 GRAMS